# Patient Record
Sex: MALE | Race: WHITE | NOT HISPANIC OR LATINO
[De-identification: names, ages, dates, MRNs, and addresses within clinical notes are randomized per-mention and may not be internally consistent; named-entity substitution may affect disease eponyms.]

---

## 2017-01-02 ENCOUNTER — RX RENEWAL (OUTPATIENT)
Age: 67
End: 2017-01-02

## 2017-04-13 ENCOUNTER — APPOINTMENT (OUTPATIENT)
Dept: NEUROLOGY | Facility: CLINIC | Age: 67
End: 2017-04-13

## 2017-04-13 VITALS
HEART RATE: 69 BPM | OXYGEN SATURATION: 99 % | HEIGHT: 65 IN | SYSTOLIC BLOOD PRESSURE: 148 MMHG | DIASTOLIC BLOOD PRESSURE: 76 MMHG | WEIGHT: 160 LBS | BODY MASS INDEX: 26.66 KG/M2

## 2017-04-13 RX ORDER — CARBIDOPA AND LEVODOPA 25; 250 MG/1; MG/1
25-250 TABLET ORAL
Qty: 90 | Refills: 0 | Status: DISCONTINUED | COMMUNITY
Start: 2016-12-15

## 2017-04-13 RX ORDER — HYDROCODONE BITARTRATE AND HOMATROPINE METHYLBROMIDE 5; 1.5 MG/5ML; MG/5ML
5-1.5 SYRUP ORAL
Qty: 150 | Refills: 0 | Status: DISCONTINUED | COMMUNITY
Start: 2017-03-13

## 2017-10-12 ENCOUNTER — APPOINTMENT (OUTPATIENT)
Dept: NEUROLOGY | Facility: CLINIC | Age: 67
End: 2017-10-12
Payer: MEDICARE

## 2017-10-12 VITALS
OXYGEN SATURATION: 99 % | DIASTOLIC BLOOD PRESSURE: 72 MMHG | HEART RATE: 67 BPM | WEIGHT: 162 LBS | HEIGHT: 65 IN | SYSTOLIC BLOOD PRESSURE: 132 MMHG | BODY MASS INDEX: 26.99 KG/M2

## 2017-10-12 PROCEDURE — 99214 OFFICE O/P EST MOD 30 MIN: CPT

## 2018-04-12 ENCOUNTER — APPOINTMENT (OUTPATIENT)
Dept: NEUROLOGY | Facility: CLINIC | Age: 68
End: 2018-04-12

## 2018-07-09 ENCOUNTER — RX RENEWAL (OUTPATIENT)
Age: 68
End: 2018-07-09

## 2018-10-08 ENCOUNTER — RX RENEWAL (OUTPATIENT)
Age: 68
End: 2018-10-08

## 2018-10-10 ENCOUNTER — RX RENEWAL (OUTPATIENT)
Age: 68
End: 2018-10-10

## 2019-02-18 ENCOUNTER — APPOINTMENT (OUTPATIENT)
Dept: CARDIOLOGY | Facility: CLINIC | Age: 69
End: 2019-02-18
Payer: MEDICARE

## 2019-02-18 ENCOUNTER — NON-APPOINTMENT (OUTPATIENT)
Age: 69
End: 2019-02-18

## 2019-02-18 VITALS
OXYGEN SATURATION: 97 % | HEART RATE: 72 BPM | BODY MASS INDEX: 27.97 KG/M2 | WEIGHT: 174 LBS | DIASTOLIC BLOOD PRESSURE: 80 MMHG | SYSTOLIC BLOOD PRESSURE: 154 MMHG | HEIGHT: 66 IN

## 2019-02-18 PROCEDURE — 99214 OFFICE O/P EST MOD 30 MIN: CPT

## 2019-02-18 PROCEDURE — 93000 ELECTROCARDIOGRAM COMPLETE: CPT

## 2019-02-18 NOTE — DISCUSSION/SUMMARY
[FreeTextEntry1] : The patient has no signs or symptoms of active heart disease. He does have some mild palpitations and will wear a 24-hour Holter monitor. He had a high calcium score in 2016. He will repeat a stress test. We again discussed the importance of cholesterol lowering. Since he cannot tolerate regular statin medication and his LDL cholesterol remains high in the red yeast rice, I will try to get approval for the new injectable cholesterol medications. In the meantime he will go back on his red yeast rice.\par \par I would appreciate your sending a copy of recent bloodwork. Pending the above I would see him in 6 months. He needs to lose weight and hopefully his blood pressure will improve.

## 2019-02-18 NOTE — HISTORY OF PRESENT ILLNESS
[FreeTextEntry1] : I saw Erickson Schulz in the office today for a followup visit. He is a 69-year-old white male being treated for hypertension and hyperlipidemia. He has a family history of ischemic heart disease with mother and dad having heart problems in their early 60s.\par \par The patient exercises regularly and has no chest pain or shortness of breath. There is a history of prostate cancer. Status post radical prostatectomy about 10 years ago and is considered to be cured. Patient also has a tremor of his right arm and has been diagnosed with early Parkinson's disease. He is taking Azilect..\par \par The patient had a cardiac calcium score performed 2/16. The total score was 573, , circumflex 165, and RCA 70.\par \par The patient had a regular stress test 6/15 that showed no ischemia to a workload of 10 Mets. Had a carotid Doppler in January 2014 that showed mild nonobstructive plaque, without change compared to 2011.\par \par Blood work from your office dated 9/16 demonstrates a total cholesterol 208, triglycerides 62, HDL 66, and . The patient has a lot of body aches. Intolerant to statin medication Does have spinal stenosis and a lot of arthritis.\par \par He underwent spinal surgery in the fall. He had a synovial cyst. This went well but he has been sedentary during the recovery period also has stopped his Lipitor which he claims has been giving him a lot of body aches.\par \par Since I last saw the patient in 9/16 he's gained more than 10 pounds. His blood pressure is minimally elevated today which he says happens when he gains weight. He has been complaining of a slight fluttering in his chest that lasts for a few seconds and happens every day. He has no hemodynamic symptoms with this palpitation\par \par A resting 12-lead electrocardiogram demonstrates sinus rhythm and remains normal. He no longer is taking red yeast rice.

## 2019-02-18 NOTE — REASON FOR VISIT
[Follow-Up - Clinic] : a clinic follow-up of [Carotid Artery Stenosis] : carotid stenosis [Hyperlipidemia] : hyperlipidemia [Hypertension] : hypertension [FreeTextEntry1] : Family history of ischemic heart disease

## 2019-02-18 NOTE — PHYSICAL EXAM
[General Appearance - Well Developed] : well developed [Normal Appearance] : normal appearance [Well Groomed] : well groomed [General Appearance - Well Nourished] : well nourished [No Deformities] : no deformities [General Appearance - In No Acute Distress] : no acute distress [Normal Conjunctiva] : the conjunctiva exhibited no abnormalities [Normal Oral Mucosa] : normal oral mucosa [Normal Jugular Venous A Waves Present] : normal jugular venous A waves present [Normal Jugular Venous V Waves Present] : normal jugular venous V waves present [No Jugular Venous Alegria A Waves] : no jugular venous alegria A waves [Respiration, Rhythm And Depth] : normal respiratory rhythm and effort [Exaggerated Use Of Accessory Muscles For Inspiration] : no accessory muscle use [Auscultation Breath Sounds / Voice Sounds] : lungs were clear to auscultation bilaterally [Bowel Sounds] : normal bowel sounds [Abdomen Soft] : soft [Abdomen Tenderness] : non-tender [Abnormal Walk] : normal gait [Gait - Sufficient For Exercise Testing] : the gait was sufficient for exercise testing [Nail Clubbing] : no clubbing of the fingernails [Cyanosis, Localized] : no localized cyanosis [Skin Color & Pigmentation] : normal skin color and pigmentation [Skin Turgor] : normal skin turgor [] : no rash [Oriented To Time, Place, And Person] : oriented to person, place, and time [Impaired Insight] : insight and judgment were intact [No Anxiety] : not feeling anxious [5th Left ICS - MCL] : palpated at the 5th LICS in the midclavicular line [Normal] : normal [No Precordial Heave] : no precordial heave was noted [Normal Rate] : normal [Normal S1] : normal S1 [Normal S2] : normal S2 [No Murmur] : no murmurs heard [2+] : left 2+ [No Abnormalities] : the abdominal aorta was not enlarged and no bruit was heard [No Pitting Edema] : no pitting edema present [Apical Thrill] : no thrill palpable at the apex [S3] : no S3 [S4] : no S4 [Right Carotid Bruit] : no bruit heard over the right carotid [Left Carotid Bruit] : no bruit heard over the left carotid [Right Femoral Bruit] : no bruit heard over the right femoral artery [Left Femoral Bruit] : no bruit heard over the left femoral artery

## 2019-02-18 NOTE — REVIEW OF SYSTEMS
[Eyeglasses] : currently wearing eyeglasses [Heartburn] : heartburn [Joint Pain] : joint pain [Skin: A Rash] : rash: [Negative] : Neurological [Fever] : no fever [Headache] : no headache [Chills] : no chills [Feeling Fatigued] : not feeling fatigued [Earache] : no earache [Sore Throat] : no sore throat [Sinus Pressure] : no sinus pressure [Skin Lesions] : no skin lesions [Depression] : no depression [Anxiety] : no anxiety [Excessive Thirst] : no polydipsia [Easy Bleeding] : no tendency for easy bleeding [Easy Bruising] : no tendency for easy bruising

## 2019-02-21 LAB
CHOLEST SERPL-MCNC: 198 MG/DL
CHOLEST/HDLC SERPL: 2.8 RATIO
HDLC SERPL-MCNC: 72 MG/DL
LDLC SERPL CALC-MCNC: 119 MG/DL
TRIGL SERPL-MCNC: 34 MG/DL

## 2019-03-06 ENCOUNTER — APPOINTMENT (OUTPATIENT)
Dept: CARDIOLOGY | Facility: CLINIC | Age: 69
End: 2019-03-06
Payer: MEDICARE

## 2019-03-06 PROCEDURE — 93224 XTRNL ECG REC UP TO 48 HRS: CPT

## 2019-03-13 ENCOUNTER — NON-APPOINTMENT (OUTPATIENT)
Age: 69
End: 2019-03-13

## 2019-04-09 ENCOUNTER — APPOINTMENT (OUTPATIENT)
Dept: CARDIOLOGY | Facility: CLINIC | Age: 69
End: 2019-04-09

## 2019-04-20 ENCOUNTER — TRANSCRIPTION ENCOUNTER (OUTPATIENT)
Age: 69
End: 2019-04-20

## 2019-05-22 ENCOUNTER — NON-APPOINTMENT (OUTPATIENT)
Age: 69
End: 2019-05-22

## 2019-05-23 ENCOUNTER — RESULT REVIEW (OUTPATIENT)
Age: 69
End: 2019-05-23

## 2019-09-21 ENCOUNTER — INPATIENT (INPATIENT)
Facility: HOSPITAL | Age: 69
LOS: 0 days | Discharge: HOME | DRG: 123 | End: 2019-09-22
Attending: FAMILY MEDICINE | Admitting: FAMILY MEDICINE
Payer: MEDICARE

## 2019-09-21 VITALS
HEART RATE: 66 BPM | DIASTOLIC BLOOD PRESSURE: 96 MMHG | RESPIRATION RATE: 17 BRPM | WEIGHT: 169.98 LBS | OXYGEN SATURATION: 98 % | SYSTOLIC BLOOD PRESSURE: 160 MMHG | TEMPERATURE: 98 F | HEIGHT: 66 IN

## 2019-09-21 DIAGNOSIS — R41.82 ALTERED MENTAL STATUS, UNSPECIFIED: ICD-10-CM

## 2019-09-21 DIAGNOSIS — Z90.79 ACQUIRED ABSENCE OF OTHER GENITAL ORGAN(S): Chronic | ICD-10-CM

## 2019-09-21 DIAGNOSIS — M71.30 OTHER BURSAL CYST, UNSPECIFIED SITE: Chronic | ICD-10-CM

## 2019-09-21 DIAGNOSIS — S89.90XA UNSPECIFIED INJURY OF UNSPECIFIED LOWER LEG, INITIAL ENCOUNTER: Chronic | ICD-10-CM

## 2019-09-21 LAB
ALBUMIN SERPL ELPH-MCNC: 3.4 G/DL — SIGNIFICANT CHANGE UP (ref 3.3–5)
ALBUMIN SERPL ELPH-MCNC: 3.8 G/DL — SIGNIFICANT CHANGE UP (ref 3.3–5)
ALP SERPL-CCNC: 54 U/L — SIGNIFICANT CHANGE UP (ref 40–120)
ALP SERPL-CCNC: 61 U/L — SIGNIFICANT CHANGE UP (ref 40–120)
ALT FLD-CCNC: 22 U/L — SIGNIFICANT CHANGE UP (ref 12–78)
ALT FLD-CCNC: 23 U/L — SIGNIFICANT CHANGE UP (ref 12–78)
ANION GAP SERPL CALC-SCNC: 6 MMOL/L — SIGNIFICANT CHANGE UP (ref 5–17)
ANION GAP SERPL CALC-SCNC: 7 MMOL/L — SIGNIFICANT CHANGE UP (ref 5–17)
APTT BLD: 32.2 SEC — SIGNIFICANT CHANGE UP (ref 27.5–36.3)
AST SERPL-CCNC: 19 U/L — SIGNIFICANT CHANGE UP (ref 15–37)
AST SERPL-CCNC: 24 U/L — SIGNIFICANT CHANGE UP (ref 15–37)
BILIRUB SERPL-MCNC: 0.5 MG/DL — SIGNIFICANT CHANGE UP (ref 0.2–1.2)
BILIRUB SERPL-MCNC: 0.7 MG/DL — SIGNIFICANT CHANGE UP (ref 0.2–1.2)
BUN SERPL-MCNC: 18 MG/DL — SIGNIFICANT CHANGE UP (ref 7–23)
BUN SERPL-MCNC: 18 MG/DL — SIGNIFICANT CHANGE UP (ref 7–23)
CALCIUM SERPL-MCNC: 8.5 MG/DL — SIGNIFICANT CHANGE UP (ref 8.5–10.1)
CALCIUM SERPL-MCNC: 8.6 MG/DL — SIGNIFICANT CHANGE UP (ref 8.5–10.1)
CHLORIDE SERPL-SCNC: 108 MMOL/L — SIGNIFICANT CHANGE UP (ref 96–108)
CHLORIDE SERPL-SCNC: 110 MMOL/L — HIGH (ref 96–108)
CK SERPL-CCNC: 118 U/L — SIGNIFICANT CHANGE UP (ref 26–308)
CO2 SERPL-SCNC: 27 MMOL/L — SIGNIFICANT CHANGE UP (ref 22–31)
CO2 SERPL-SCNC: 28 MMOL/L — SIGNIFICANT CHANGE UP (ref 22–31)
CREAT SERPL-MCNC: 1.2 MG/DL — SIGNIFICANT CHANGE UP (ref 0.5–1.3)
CREAT SERPL-MCNC: 1.25 MG/DL — SIGNIFICANT CHANGE UP (ref 0.5–1.3)
GLUCOSE SERPL-MCNC: 136 MG/DL — HIGH (ref 70–99)
GLUCOSE SERPL-MCNC: 97 MG/DL — SIGNIFICANT CHANGE UP (ref 70–99)
HCT VFR BLD CALC: 42.4 % — SIGNIFICANT CHANGE UP (ref 39–50)
HGB BLD-MCNC: 13.9 G/DL — SIGNIFICANT CHANGE UP (ref 13–17)
INR BLD: 0.95 RATIO — SIGNIFICANT CHANGE UP (ref 0.88–1.16)
MCHC RBC-ENTMCNC: 29.7 PG — SIGNIFICANT CHANGE UP (ref 27–34)
MCHC RBC-ENTMCNC: 32.8 GM/DL — SIGNIFICANT CHANGE UP (ref 32–36)
MCV RBC AUTO: 90.6 FL — SIGNIFICANT CHANGE UP (ref 80–100)
NT-PROBNP SERPL-SCNC: 21 PG/ML — SIGNIFICANT CHANGE UP (ref 0–125)
PLATELET # BLD AUTO: 253 K/UL — SIGNIFICANT CHANGE UP (ref 150–400)
POTASSIUM SERPL-MCNC: 3.6 MMOL/L — SIGNIFICANT CHANGE UP (ref 3.5–5.3)
POTASSIUM SERPL-MCNC: 4.4 MMOL/L — SIGNIFICANT CHANGE UP (ref 3.5–5.3)
POTASSIUM SERPL-SCNC: 3.6 MMOL/L — SIGNIFICANT CHANGE UP (ref 3.5–5.3)
POTASSIUM SERPL-SCNC: 4.4 MMOL/L — SIGNIFICANT CHANGE UP (ref 3.5–5.3)
PROT SERPL-MCNC: 6.6 GM/DL — SIGNIFICANT CHANGE UP (ref 6–8.3)
PROT SERPL-MCNC: 7.2 GM/DL — SIGNIFICANT CHANGE UP (ref 6–8.3)
PROTHROM AB SERPL-ACNC: 10.5 SEC — SIGNIFICANT CHANGE UP (ref 10–12.9)
RBC # BLD: 4.68 M/UL — SIGNIFICANT CHANGE UP (ref 4.2–5.8)
RBC # FLD: 12.8 % — SIGNIFICANT CHANGE UP (ref 10.3–14.5)
SODIUM SERPL-SCNC: 142 MMOL/L — SIGNIFICANT CHANGE UP (ref 135–145)
SODIUM SERPL-SCNC: 144 MMOL/L — SIGNIFICANT CHANGE UP (ref 135–145)
TROPONIN I SERPL-MCNC: <0.015 NG/ML — SIGNIFICANT CHANGE UP (ref 0.01–0.04)
WBC # BLD: 7.66 K/UL — SIGNIFICANT CHANGE UP (ref 3.8–10.5)
WBC # FLD AUTO: 7.66 K/UL — SIGNIFICANT CHANGE UP (ref 3.8–10.5)

## 2019-09-21 PROCEDURE — 71045 X-RAY EXAM CHEST 1 VIEW: CPT | Mod: 26

## 2019-09-21 PROCEDURE — 70544 MR ANGIOGRAPHY HEAD W/O DYE: CPT | Mod: XU

## 2019-09-21 PROCEDURE — 80053 COMPREHEN METABOLIC PANEL: CPT

## 2019-09-21 PROCEDURE — 85025 COMPLETE CBC W/AUTO DIFF WBC: CPT

## 2019-09-21 PROCEDURE — 82550 ASSAY OF CK (CPK): CPT

## 2019-09-21 PROCEDURE — 36415 COLL VENOUS BLD VENIPUNCTURE: CPT

## 2019-09-21 PROCEDURE — 70450 CT HEAD/BRAIN W/O DYE: CPT | Mod: 26

## 2019-09-21 PROCEDURE — 84100 ASSAY OF PHOSPHORUS: CPT

## 2019-09-21 PROCEDURE — 86803 HEPATITIS C AB TEST: CPT

## 2019-09-21 PROCEDURE — 93010 ELECTROCARDIOGRAM REPORT: CPT

## 2019-09-21 PROCEDURE — 70547 MR ANGIOGRAPHY NECK W/O DYE: CPT

## 2019-09-21 PROCEDURE — 70551 MRI BRAIN STEM W/O DYE: CPT

## 2019-09-21 PROCEDURE — 93971 EXTREMITY STUDY: CPT | Mod: 26,RT

## 2019-09-21 PROCEDURE — 83735 ASSAY OF MAGNESIUM: CPT

## 2019-09-21 RX ORDER — RASAGILINE 0.5 MG/1
1 TABLET ORAL DAILY
Refills: 0 | Status: DISCONTINUED | OUTPATIENT
Start: 2019-09-21 | End: 2019-09-22

## 2019-09-21 RX ORDER — PANTOPRAZOLE SODIUM 20 MG/1
40 TABLET, DELAYED RELEASE ORAL
Refills: 0 | Status: DISCONTINUED | OUTPATIENT
Start: 2019-09-21 | End: 2019-09-22

## 2019-09-21 RX ORDER — ROPINIROLE 8 MG/1
6 TABLET, FILM COATED, EXTENDED RELEASE ORAL
Refills: 0 | Status: DISCONTINUED | OUTPATIENT
Start: 2019-09-21 | End: 2019-09-22

## 2019-09-21 RX ORDER — ASPIRIN/CALCIUM CARB/MAGNESIUM 324 MG
81 TABLET ORAL DAILY
Refills: 0 | Status: DISCONTINUED | OUTPATIENT
Start: 2019-09-21 | End: 2019-09-22

## 2019-09-21 NOTE — ED PROVIDER NOTE - CLINICAL SUMMARY MEDICAL DECISION MAKING FREE TEXT BOX
69 M hx of Parkinson's HLD presents to ED for weakness, double vision progressively worsening over last few days. hasn't felt right over last few days. exam with mild RLE swelling but normal neuro exam. will obtain labs, CT, US, reassess. 69 M hx of Parkinson's HLD presents to ED for weakness, double vision progressively worsening over last few days. hasn't felt right over last few days. exam with mild RLE swelling but normal neuro exam. NIH 0 outside of TPA window. will obtain labs, CT, US, reassess. 69 M hx of Parkinson's HLD presents to ED for weakness, double vision progressively worsening over last few days. hasn't felt right over last few days. exam with mild RLE swelling but normal neuro exam. NIH 0 outside of TPA window. pt with known partially detached retina being followed - new symptoms now is difficulty concentrating weakness slight AMS.  will obtain labs, CT, US, to r/o infections etiology/cva - low concern that ocular etiology is causing other symptoms.

## 2019-09-21 NOTE — ED PROVIDER NOTE - NS_ ATTENDINGSCRIBEDETAILS _ED_A_ED_FT
I, Tomas Grey MD,  performed the initial face to face bedside interview with this patient regarding history of present illness, review of symptoms and relevant past medical, social and family history.  I completed an independent physical examination.  I was the initial provider who evaluated this patient.  The history, relevant review of systems, past medical and surgical history, medical decision making, and physical examination was documented by the scribe in my presence and I attest to the accuracy of the documentation.

## 2019-09-21 NOTE — H&P ADULT - HISTORY OF PRESENT ILLNESS
70 y/o male with h/o Parkinson disease, HLD, partially detached retina with mild diplopia at baseline presents to the ED c/o difficulty concentrating, double vision noted at 11am while working on his computer.  no associated unilateral weakness, slurred speech, chest pain, neck pain or palpitations.  admits to restless sleep due to shoudler pain from arthritis.  diplopia resolved upon arrival to ED. Pt also noted RLE swelling, LE cramping, worse at night for several weeks.  recently started on new cholestrol medication as was unable to toelrate statin due to muscle aches. states is back to normal, besides RLE swelling.      In ED, LE doppler- no dvt.  ecg-normal.  tropx1-neg.  cth-no acute changes. cxr-normal

## 2019-09-21 NOTE — ED PROVIDER NOTE - OBJECTIVE STATEMENT
70 y/o male with PMHx of Parkinson's, HLD, partially detached retina presents to the ED difficulty concentrating, double vision noted at 11am. Pt reports that he has been "feeling off" for a few days now. Notes that double vision is a more severe episode of that which he had when he had known prior partially detached retina. Pt also with RLE swelling, LE cramping (worse at night) x5 days. Also with, Denies slurred speech, CP, SOB, fever. Nonsmoker. No EtOH. 68 y/o male with PMHx of Parkinson's, HLD, partially detached retina presents to the ED difficulty concentrating, double vision noted at 11am. Pt reports that he has been "feeling off" for a few days now. Notes that double vision is a more severe episode of that which he had when he had known prior partially detached retina. Pt also with RLE swelling, LE cramping (worse at night) x5 days. Also with weakness and mild ams.  Denies slurred speech, CP, SOB, fever. Nonsmoker. No EtOH. 68 y/o male with PMHx of Parkinson's, HLD, partially detached retina presents to the ED difficulty concentrating, double vision noted at 11am. Pt reports that he has been "feeling off" for a few days now. Notes that double vision is a always present 2/2 a known prior partially detached retina; however now he notices it more and it is difficult to concentrate. Pt also with RLE swelling, LE cramping (worse at night) x5 days. Also with weakness and mild ams.  Denies slurred speech, CP, SOB, fever. Nonsmoker. No EtOH.

## 2019-09-21 NOTE — PATIENT PROFILE ADULT - STATED REASON FOR ADMISSION
working on the computer this am started having double and triple vision RLE swollen thought could have thrown a clot

## 2019-09-21 NOTE — H&P ADULT - ASSESSMENT
diplopia, generalized weakness and lethergy, with partial retinal detachement, r/o cva  -admit to tele   -neuro consult   -neuro checks q6h  -strict i/o  -check am labs     RLE swelling and aches, doppler- no dvt   -check cpk    parkson disease  -cont home meds     dvt prophylaxis   -ipc bilaterally

## 2019-09-21 NOTE — ED ADULT TRIAGE NOTE - CHIEF COMPLAINT QUOTE
pt began feeling tired/foggy and having double vision at 0500. Assessed by Dr. Garay , no code stroke called. Direct bed into main .

## 2019-09-21 NOTE — H&P ADULT - NSHPPHYSICALEXAM_GEN_ALL_CORE
PHYSICAL EXAM:    GENERAL: NAD, Alert & Oriented X3, Well-groomed, Well-developed  HEENT: Moist mucous membranes  HEAD:  Atraumatic, Normocephalic  EYES: EOMI, PERRLA, Conjunctiva and sclera clear  NECK: Supple, No JVD, No lymphadenopathy  CHEST/LUNG: Clear to auscultation bilaterally; No rales, rhonchi, wheezing, or rubs  HEART: Regular rate and rhythm; No murmurs, rubs, or gallops  ABDOMEN: Soft, Non-tender, Non-distended; Bowel sounds present  GENITOURINARY- Voiding, No palpable bladder  EXTREMITIES:  2+ Peripheral Pulses, No clubbing, cyanosis.  +2 LE pitting edema on right  MUSCULOSKELTAL- No muscle tenderness, Muscle tone normal, No joint tenderness, no Joint swelling, FROM  SKIN: No rash, No lesion  NEURO: CN II-XII intact, Motor Strength- 5/5 B/L upper and lower extremities; DTRs 2+ intact and symmetric.  +tremor in right hand

## 2019-09-21 NOTE — ED ADULT NURSE NOTE - OBJECTIVE STATEMENT
PT c/o double vision at 0500 this am.  PT denies other neurological symptoms relating to BL limbs and aphagia.  VSS, pt c/o BL LE cramping.  VSS

## 2019-09-21 NOTE — ED ADULT NURSE NOTE - NSIMPLEMENTINTERV_GEN_ALL_ED
Implemented All Universal Safety Interventions:  [place] to call system. Call bell, personal items and telephone within reach. Instruct patient to call for assistance. Room bathroom lighting operational. Non-slip footwear when patient is off stretcher. Physically safe environment: no spills, clutter or unnecessary equipment. Stretcher in lowest position, wheels locked, appropriate side rails in place.

## 2019-09-21 NOTE — ED PROVIDER NOTE - PROGRESS NOTE DETAILS
labs ct unremarkable but pt still feeling off mild double vision and difficulty concentrating slightly altered as per family - pt endorsed to Dr. Shine requests that we DNM. Tomas Grey M.D., Attending Physician

## 2019-09-21 NOTE — ED PROVIDER NOTE - PHYSICAL EXAMINATION
Constitutional: mild distress AAOx3  Eyes: PERRLA EOMI  Head: Normocephalic atraumatic  Mouth: MMM  Cardiac: regular rate. normal peripheral pulses.  Resp: Lungs CTAB  GI: Abd s/nt/nd  Neuro: CN2-12 intact.  normal strength, sensation, coordination. NIH=0  Skin: No rashes. +swelling to RLE. Constitutional: mild distress AAOx3  Eyes: PERRLA EOMI  Head: Normocephalic atraumatic  Mouth: MMM  Cardiac: regular rate. normal peripheral pulses.  Resp: Lungs CTAB  GI: Abd s/nt/nd  Neuro: CN2-12 intact.  normal strength, sensation, coordination. NIH=0 resting tremor  Skin: No rashes. +swelling to RLE.

## 2019-09-22 ENCOUNTER — TRANSCRIPTION ENCOUNTER (OUTPATIENT)
Age: 69
End: 2019-09-22

## 2019-09-22 VITALS
RESPIRATION RATE: 18 BRPM | HEART RATE: 66 BPM | SYSTOLIC BLOOD PRESSURE: 145 MMHG | OXYGEN SATURATION: 98 % | DIASTOLIC BLOOD PRESSURE: 66 MMHG | TEMPERATURE: 98 F

## 2019-09-22 LAB
BASOPHILS # BLD AUTO: 0.06 K/UL — SIGNIFICANT CHANGE UP (ref 0–0.2)
BASOPHILS NFR BLD AUTO: 0.8 % — SIGNIFICANT CHANGE UP (ref 0–2)
EOSINOPHIL # BLD AUTO: 0.28 K/UL — SIGNIFICANT CHANGE UP (ref 0–0.5)
EOSINOPHIL NFR BLD AUTO: 3.8 % — SIGNIFICANT CHANGE UP (ref 0–6)
HCT VFR BLD CALC: 41.9 % — SIGNIFICANT CHANGE UP (ref 39–50)
HCV AB S/CO SERPL IA: 0.16 S/CO — SIGNIFICANT CHANGE UP (ref 0–0.99)
HCV AB SERPL-IMP: SIGNIFICANT CHANGE UP
HGB BLD-MCNC: 13.7 G/DL — SIGNIFICANT CHANGE UP (ref 13–17)
IMM GRANULOCYTES NFR BLD AUTO: 0.3 % — SIGNIFICANT CHANGE UP (ref 0–1.5)
LYMPHOCYTES # BLD AUTO: 2.38 K/UL — SIGNIFICANT CHANGE UP (ref 1–3.3)
LYMPHOCYTES # BLD AUTO: 32.2 % — SIGNIFICANT CHANGE UP (ref 13–44)
MAGNESIUM SERPL-MCNC: 2.3 MG/DL — SIGNIFICANT CHANGE UP (ref 1.6–2.6)
MCHC RBC-ENTMCNC: 29.6 PG — SIGNIFICANT CHANGE UP (ref 27–34)
MCHC RBC-ENTMCNC: 32.7 GM/DL — SIGNIFICANT CHANGE UP (ref 32–36)
MCV RBC AUTO: 90.5 FL — SIGNIFICANT CHANGE UP (ref 80–100)
MONOCYTES # BLD AUTO: 0.58 K/UL — SIGNIFICANT CHANGE UP (ref 0–0.9)
MONOCYTES NFR BLD AUTO: 7.8 % — SIGNIFICANT CHANGE UP (ref 2–14)
NEUTROPHILS # BLD AUTO: 4.08 K/UL — SIGNIFICANT CHANGE UP (ref 1.8–7.4)
NEUTROPHILS NFR BLD AUTO: 55.1 % — SIGNIFICANT CHANGE UP (ref 43–77)
PHOSPHATE SERPL-MCNC: 2.3 MG/DL — LOW (ref 2.5–4.5)
PLATELET # BLD AUTO: 260 K/UL — SIGNIFICANT CHANGE UP (ref 150–400)
RBC # BLD: 4.63 M/UL — SIGNIFICANT CHANGE UP (ref 4.2–5.8)
RBC # FLD: 12.8 % — SIGNIFICANT CHANGE UP (ref 10.3–14.5)
WBC # BLD: 7.4 K/UL — SIGNIFICANT CHANGE UP (ref 3.8–10.5)
WBC # FLD AUTO: 7.4 K/UL — SIGNIFICANT CHANGE UP (ref 3.8–10.5)

## 2019-09-22 PROCEDURE — 70551 MRI BRAIN STEM W/O DYE: CPT | Mod: 26

## 2019-09-22 PROCEDURE — 99223 1ST HOSP IP/OBS HIGH 75: CPT

## 2019-09-22 PROCEDURE — 70544 MR ANGIOGRAPHY HEAD W/O DYE: CPT | Mod: 26,59

## 2019-09-22 PROCEDURE — 72198 MR ANGIO PELVIS W/O & W/DYE: CPT | Mod: 26

## 2019-09-22 PROCEDURE — 70547 MR ANGIOGRAPHY NECK W/O DYE: CPT | Mod: 26

## 2019-09-22 RX ADMIN — RASAGILINE 1 MILLIGRAM(S): 0.5 TABLET ORAL at 05:34

## 2019-09-22 RX ADMIN — PANTOPRAZOLE SODIUM 40 MILLIGRAM(S): 20 TABLET, DELAYED RELEASE ORAL at 05:33

## 2019-09-22 RX ADMIN — ROPINIROLE 6 MILLIGRAM(S): 8 TABLET, FILM COATED, EXTENDED RELEASE ORAL at 05:34

## 2019-09-22 RX ADMIN — Medication 81 MILLIGRAM(S): at 11:24

## 2019-09-22 NOTE — DISCHARGE NOTE NURSING/CASE MANAGEMENT/SOCIAL WORK - PATIENT PORTAL LINK FT
You can access the FollowMyHealth Patient Portal offered by [facility] by registering at the following website: [email or web address]. By joining [facility]’s FollowMyHealth portal, you will also be able to view your health information using other applications (apps) compatible with our system.

## 2019-09-22 NOTE — DISCHARGE NOTE PROVIDER - HOSPITAL COURSE
70 y/o male with h/o Parkinson disease, HLD, partially detached retina with mild diplopia at baseline presents to the ED c/o difficulty concentrating, double vision noted at 11am while working on his computer.  no associated unilateral weakness, slurred speech, chest pain, neck pain or palpitations.  admits to restless sleep due to shoulder pain from arthritis.  diplopia resolved upon arrival to ED. Pt also noted RLE swelling, LE cramping, worse at night for several weeks.  recently started on new cholesterol medication as was unable to tolerate statin due to muscle aches. states is back to normal, besides RLE swelling.      He was admitted for further w/u. He underwent RLE doppler which was negative. MRI head, MRA head and neck were negative. Tele was negative. His symptoms did not recur while here. HE was seen by neurology while here as well. He will be discharged home today. He is advised outpt f/u with opthalmology        T(C): 36.5 (09-22-19 @ 05:26), Max: 36.8 (09-21-19 @ 2FOCEBS8:47)    HR: 66 (09-22-19 @ 05:26) (65 - 80)    BP: 148/74 (09-22-19 @ 05:26) (113/76 - 160/96)    RR: 18 (09-22-19 @ 05:26) (16 - 18)    SpO2: 95% (09-22-19 @ 05:26) (95% - 100%)            PHYSICAL EXAM:        GENERAL: Comfortable, no acute distress    HEAD:  Atraumatic, Normocephalic    EYES: EOMI, PERRLA    HEENT: Moist mucous membranes    NECK: Supple, No JVD    NERVOUS SYSTEM:  Alert & Oriented X3, Motor Strength 5/5 B/L upper and lower extremities, +tremor, cogwheel rigidity+    CHEST/LUNG: Clear to auscultation bilaterally    HEART: Regular rate and rhythm; No murmurs, rubs, or gallops    ABDOMEN: Soft, Nontender, Nondistended; Bowel sounds present    GENITOURINARY- Voiding, no palpable bladder    EXTREMITIES:  No clubbing, cyanosis, or edema    MUSCULOSKELETAL- No muscle tenderness, No joint tenderness    SKIN-no rash                LABS:                            13.7     7.40  )-----------( 260      ( 22 Sep 2019 07:55 )               41.9         09-21        144  |  110<H>  |  18    ----------------------------<  136<H>    3.6   |  27  |  1.20        Ca    8.6      21 Sep 2019 16:35    Phos  2.3     09-22    Mg     2.3     09-22        TPro  6.6  /  Alb  3.4  /  TBili  0.7  /  DBili  x   /  AST  19  /  ALT  22  /  AlkPhos  54  09-21    PT/INR - ( 21 Sep 2019 12:16 )   PT: 10.5 sec;   INR: 0.95 ratio      PTT - ( 21 Sep 2019 12:16 )  PTT:32.2 sec         MR Head No Cont (09.22.19 @ 09:10) >    IMPRESSION:     Brain MRI: No acute infarct.    Brain MRA: No large vessel occlusion or aneurysm.    Neck MRA: No hemodynamically significant stenosis of the bilateral     cervical ICAs by NASCET criteria.        US Duplex Venous Lower Ext Ltd, Right (09.21.19 @ 13:30) >    IMPRESSION:   Unremarkable ultrasound of the right lower extremity deep     venous system.            FINAL DIAGNOSIS:    1. DIPLOPIA-IMPROVED, OUTPT F/U WITH OPTHALMOLOGY, CVA RULED OUT.    2. PARTIAL RETINAL DETACHMENT.    3. PARKINSONS    4. HLD        time taken for dc 42 min    plan d/w patient/neurology.     left message for pcp re: dc.

## 2019-09-22 NOTE — DISCHARGE NOTE PROVIDER - NSDCCPCAREPLAN_GEN_ALL_CORE_FT
PRINCIPAL DISCHARGE DIAGNOSIS  Diagnosis: Diplopia  Assessment and Plan of Treatment: further evaluation  follow up with your opthalmologist/retinal specialist this week.

## 2019-09-22 NOTE — CONSULT NOTE ADULT - ASSESSMENT
70 y/o male with h/o Parkinson disease, HLD, partially detached retina with mild diplopia at baseline presents to the ED c/o difficulty concentrating, double vision noted at 11am while working on his computer. R/o Metabolic causes    R/o Neurocardiogenic causes.    MRI /MRA brain negative.    H/o PD stable with meds.    Correct underlying metabolic causes.    D/c planning.    Follow up with PCP and Neurologist.

## 2019-09-22 NOTE — CONSULT NOTE ADULT - SUBJECTIVE AND OBJECTIVE BOX
Patient is a 69y old  Male who presents with a chief complaint of diplopia, weakness       HPI:  68 y/o male with h/o Parkinson disease, HLD, partially detached retina with mild diplopia at baseline presents to the ED c/o difficulty concentrating, double vision noted at 11am yesterday  while working on his computer.  No associated unilateral weakness, slurred speech, chest pain, neck pain or palpitations. H/o  diplopia in the past worsening after his sx started which resolved upon arrival to ED. Pt also noted RLE swelling, LE cramping, worse at night for several weeks.  Recently  started on new cholestrol medication as was unable to tolerate   statin due to muscle aches. States  is back to normal, besides RLE swelling.  Being followed by DR. Willian watson for PD . No new PD sx.  Feeling better and wants to go home.     PAST MEDICAL & SURGICAL HISTORY:  Cataracts, bilateral  Parkinson disease  HLD (hyperlipidemia)  Synovial cyst: removed from back and LEFT HAND  H/O radical prostatectomy: 2003  Knee injury: Arthroscopy 1994 due to soccer injury      FAMILY HISTORY:      Social Hx:  Nonsmoker, no drug or alcohol use    MEDICATIONS  (STANDING):  aspirin  chewable 81 milliGRAM(s) Oral daily  pantoprazole    Tablet 40 milliGRAM(s) Oral before breakfast  rasagiline Tablet 1 milliGRAM(s) Oral daily  rOPINIRole 6 milliGRAM(s) Oral two times a day       Allergies    No Known Allergies      ROS: Pertinent positives in HPI, all other ROS were reviewed and are negative.      Vital Signs Last 24 Hrs  T(C): 36.5 (22 Sep 2019 05:26), Max: 36.8 (21 Sep 2019 20:47)  T(F): 97.7 (22 Sep 2019 05:26), Max: 98.3 (21 Sep 2019 20:47)  HR: 66 (22 Sep 2019 05:26) (65 - 80)  BP: 148/74 (22 Sep 2019 05:26) (113/76 - 160/96)  BP(mean): --  RR: 18 (22 Sep 2019 05:26) (16 - 18)  SpO2: 95% (22 Sep 2019 05:26) (95% - 100%)        Constitutional: awake and alert.  HEENT: PERRLA, EOMI, No diplopia   now  Neck: Supple.  Respiratory: Breath sounds are clear bilaterally  Cardiovascular: S1 and S2, regular  rhythm  Gastrointestinal: soft, nontender  Extremities:  no edema  Vascular:  Carotid Bruit - no  Musculoskeletal: no joint swelling/tenderness, no abnormal movements  Skin: No rashes    Neurological exam:  HF: A x O x 3. Appropriately interactive, normal affect. Speech fluent, No Aphasia or paraphasic errors.   CN: LIGIA, EOMI, VFF, facial sensation normal, no NLFD, tongue midline, gag intact  Motor: No pronator drift, Strength 5/5 in all 4 ext, normal bulk and tone, Rest tremors, No cogwheel rigidity.    Sens: Intact to light touch   Reflexes: Symmetric and normal . BJ 2+, BR 2+, KJ 2+, AJ 1+, downgoing toes b/l  Coord:  No FNFA, dysmetria, DOLORES intact   Gait/Balance: Cannot test    NIHSS: 0      Labs:   09-21    144  |  110<H>  |  18  ----------------------------<  136<H>  3.6   |  27  |  1.20    Ca    8.6      21 Sep 2019 16:35  Phos  2.3     09-22  Mg     2.3     09-22    TPro  6.6  /  Alb  3.4  /  TBili  0.7  /  DBili  x   /  AST  19  /  ALT  22  /  AlkPhos  54  09-21                              13.7   7.40  )-----------( 260      ( 22 Sep 2019 07:55 )             41.9       Radiology:    < from: MR Angio Neck No Cont (09.22.19 @ 09:10) >    IMPRESSION:     Brain MRI: No acute infarct.    Brain MRA: No large vessel occlusion or aneurysm.    Neck MRA: No hemodynamically significant stenosis of the bilateral   cervical ICAs by NASCET criteria.    < from: CT Head No Cont (09.21.19 @ 12:15) >  Impression: No evidence of acute hemorrhage mass or mass effect.

## 2019-10-01 DIAGNOSIS — H53.2 DIPLOPIA: ICD-10-CM

## 2019-10-01 DIAGNOSIS — R53.1 WEAKNESS: ICD-10-CM

## 2019-10-01 DIAGNOSIS — R41.82 ALTERED MENTAL STATUS, UNSPECIFIED: ICD-10-CM

## 2019-10-01 DIAGNOSIS — G20 PARKINSON'S DISEASE: ICD-10-CM

## 2019-10-01 DIAGNOSIS — H33.20 SEROUS RETINAL DETACHMENT, UNSPECIFIED EYE: ICD-10-CM

## 2019-10-01 DIAGNOSIS — E78.5 HYPERLIPIDEMIA, UNSPECIFIED: ICD-10-CM

## 2019-10-01 DIAGNOSIS — R60.0 LOCALIZED EDEMA: ICD-10-CM

## 2019-10-31 PROBLEM — H26.9 UNSPECIFIED CATARACT: Chronic | Status: ACTIVE | Noted: 2019-09-21

## 2019-10-31 PROBLEM — E78.5 HYPERLIPIDEMIA, UNSPECIFIED: Chronic | Status: ACTIVE | Noted: 2019-09-21

## 2019-10-31 PROBLEM — G20 PARKINSON'S DISEASE: Chronic | Status: ACTIVE | Noted: 2019-09-21

## 2019-11-05 ENCOUNTER — APPOINTMENT (OUTPATIENT)
Dept: CARDIOLOGY | Facility: CLINIC | Age: 69
End: 2019-11-05
Payer: MEDICARE

## 2019-11-05 VITALS
SYSTOLIC BLOOD PRESSURE: 165 MMHG | BODY MASS INDEX: 28.12 KG/M2 | WEIGHT: 175 LBS | DIASTOLIC BLOOD PRESSURE: 84 MMHG | HEART RATE: 70 BPM | OXYGEN SATURATION: 97 % | HEIGHT: 66 IN

## 2019-11-05 DIAGNOSIS — G20 PARKINSON'S DISEASE: ICD-10-CM

## 2019-11-05 PROCEDURE — 99214 OFFICE O/P EST MOD 30 MIN: CPT

## 2019-11-05 NOTE — HISTORY OF PRESENT ILLNESS
[FreeTextEntry1] : I saw Erickson Schulz in the office today for a followup visit. He is a 69-year-old white male being treated for hypertension and hyperlipidemia. He has a family history of ischemic heart disease with mother and dad having heart problems in their early 60s.\par \par The patient exercises regularly and has no chest pain or shortness of breath. There is a history of prostate cancer. Status post radical prostatectomy about 10 years ago and is considered to be cured. Patient also has a tremor of his right arm and has been diagnosed with early Parkinson's disease. He is taking Azilect..\par \par The patient had a cardiac calcium score performed 2/16. The total score was 573, , circumflex 165, and RCA 70.\par \par The patient had a regular stress test 6/15 that showed no ischemia to a workload of 10 Mets. Had a carotid Doppler in January 2014 that showed mild nonobstructive plaque, without change compared to 2011.\par \par Recent blood work dated 5/19 demonstrates a total cholesterol 144, triglycerides 37, HDL 70, and LDL 60.. The patient has a lot of body aches. Intolerant to statin medication Does have spinal stenosis and a lot of arthritis.He is now on Repatha.\par \par He underwent spinal surgery in the fall. He had a synovial cyst. This went well but he has been sedentary during the recovery period also has stopped his Lipitor which he claims has been giving him a lot of body aches.\par \par Over the course of the past several years, he's gained more than 10 pounds. His blood pressure is elevated today which he says happens when he gains weight. He has been complaining of a slight fluttering in his chest that lasts for a few seconds and happens every day. He has no hemodynamic symptoms with this palpitation\par \par He had a Holter monitor performed 3/19. Heart rate between 48 and 106 with an average rate of 71. 36 VPCs, 134 APCs, one couplet, and one triplet.\par \par Since he's been on Praulent he ángel developed mild bilateral leg edema and some joint swelling and pain which he attributes to the medication. He didn't take his Sunday's dose. In addition, he never scheduled a stress test because he sprained his ankle. He has been less active as a result has continued to gain weight.\par

## 2019-11-05 NOTE — DISCUSSION/SUMMARY
[FreeTextEntry1] : The patient most temporally hold the Praluent and see if his symptoms resolve. When he is able he will schedule a stress test. He is going to make an effort to do more exercise and try to lose weight and he has gained. He'll monitor his pressure at home and let me know if it remains elevated. We may need to start him on blood pressure medication.\par \par Fortunately the patient has no signs or symptoms of active heart disease.\par \par Pending the above I would see him in 3 months. He'll be difficult if he cannot take the Praluent because he has been intolerant to statin medication.

## 2020-02-18 ENCOUNTER — APPOINTMENT (OUTPATIENT)
Dept: CARDIOLOGY | Facility: CLINIC | Age: 70
End: 2020-02-18
Payer: MEDICARE

## 2020-02-18 ENCOUNTER — NON-APPOINTMENT (OUTPATIENT)
Age: 70
End: 2020-02-18

## 2020-02-18 VITALS
SYSTOLIC BLOOD PRESSURE: 146 MMHG | OXYGEN SATURATION: 98 % | WEIGHT: 180 LBS | BODY MASS INDEX: 28.93 KG/M2 | HEART RATE: 73 BPM | HEIGHT: 66 IN | DIASTOLIC BLOOD PRESSURE: 80 MMHG

## 2020-02-18 PROCEDURE — 99214 OFFICE O/P EST MOD 30 MIN: CPT

## 2020-02-18 NOTE — REVIEW OF SYSTEMS
[Eyeglasses] : currently wearing eyeglasses [Heartburn] : heartburn [Skin: A Rash] : rash: [Joint Pain] : joint pain [Negative] : Neurological [Fever] : no fever [Headache] : no headache [Chills] : no chills [Sore Throat] : no sore throat [Earache] : no earache [Feeling Fatigued] : not feeling fatigued [Sinus Pressure] : no sinus pressure [Anxiety] : no anxiety [Skin Lesions] : no skin lesions [Depression] : no depression [Easy Bleeding] : no tendency for easy bleeding [Excessive Thirst] : no polydipsia [Easy Bruising] : no tendency for easy bruising

## 2020-02-18 NOTE — PHYSICAL EXAM
[General Appearance - Well Developed] : well developed [Normal Appearance] : normal appearance [Well Groomed] : well groomed [General Appearance - Well Nourished] : well nourished [No Deformities] : no deformities [General Appearance - In No Acute Distress] : no acute distress [Normal Conjunctiva] : the conjunctiva exhibited no abnormalities [Normal Oral Mucosa] : normal oral mucosa [Normal Jugular Venous A Waves Present] : normal jugular venous A waves present [Normal Jugular Venous V Waves Present] : normal jugular venous V waves present [No Jugular Venous Alegria A Waves] : no jugular venous alegria A waves [Exaggerated Use Of Accessory Muscles For Inspiration] : no accessory muscle use [Respiration, Rhythm And Depth] : normal respiratory rhythm and effort [Auscultation Breath Sounds / Voice Sounds] : lungs were clear to auscultation bilaterally [Bowel Sounds] : normal bowel sounds [Abdomen Soft] : soft [Abdomen Tenderness] : non-tender [Abnormal Walk] : normal gait [Cyanosis, Localized] : no localized cyanosis [Gait - Sufficient For Exercise Testing] : the gait was sufficient for exercise testing [Nail Clubbing] : no clubbing of the fingernails [Skin Color & Pigmentation] : normal skin color and pigmentation [Skin Turgor] : normal skin turgor [] : no rash [Oriented To Time, Place, And Person] : oriented to person, place, and time [Impaired Insight] : insight and judgment were intact [No Anxiety] : not feeling anxious [5th Left ICS - MCL] : palpated at the 5th LICS in the midclavicular line [Normal] : normal [Normal Rate] : normal [No Precordial Heave] : no precordial heave was noted [Normal S2] : normal S2 [Normal S1] : normal S1 [No Murmur] : no murmurs heard [2+] : left 2+ [No Abnormalities] : the abdominal aorta was not enlarged and no bruit was heard [No Pitting Edema] : no pitting edema present [S4] : no S4 [Apical Thrill] : no thrill palpable at the apex [S3] : no S3 [Right Carotid Bruit] : no bruit heard over the right carotid [Right Femoral Bruit] : no bruit heard over the right femoral artery [Left Carotid Bruit] : no bruit heard over the left carotid [Left Femoral Bruit] : no bruit heard over the left femoral artery

## 2020-02-18 NOTE — DISCUSSION/SUMMARY
[FreeTextEntry1] : The patient's cardiac status remained stable without any signs or symptoms of active heart disease. He has gained a little weight and his blood pressure is mildly elevated. Is intolerant to statin drugs and can no longer take Praluent because of side effects\par \par He will start exercising again and try to lose weight he has gained. We'll check his blood work and will come in for repeat stress test. Pending the above I would see him in 6 months.

## 2020-02-18 NOTE — HISTORY OF PRESENT ILLNESS
[FreeTextEntry1] : I saw Erickson Schulz in the office today for a followup visit. He is a 70-year-old white male being treated for hypertension and hyperlipidemia. He has a family history of ischemic heart disease with mother and dad having heart problems in their early 60s.\par \par The patient exercises regularly and has no chest pain or shortness of breath. There is a history of prostate cancer. Status post radical prostatectomy about 10 years ago and is considered to be cured. Patient also has a tremor of his right arm and has been diagnosed with early Parkinson's disease. He is taking Azilect..\par \par The patient had a cardiac calcium score performed 2/16. The total score was 573, , circumflex 165, and RCA 70.\par \par The patient had a regular stress test 6/15 that showed no ischemia to a workload of 10 Mets. Had a carotid Doppler in January 2014 that showed mild nonobstructive plaque, without change compared to 2011.\par \par Recent blood work dated 5/19 demonstrates a total cholesterol 144, triglycerides 37, HDL 70, and LDL 60.. The patient has a lot of body aches. Intolerant to statin medication Does have spinal stenosis and a lot of arthritis.He is now on Repatha.\par \par He underwent spinal surgery in the fall. He had a synovial cyst. This went well but he has been sedentary during the recovery period also has stopped his Lipitor which he claims has been giving him a lot of body aches.\par \par Over the course of the past several years, he's gained more than 10 pounds. His blood pressure is elevated today which he says happens when he gains weight. He has been complaining of a slight fluttering in his chest that lasts for a few seconds and happens every day. He has no hemodynamic symptoms with this palpitation\par \par He had a Holter monitor performed 3/19. Heart rate between 48 and 106 with an average rate of 71. 36 VPCs, 134 APCs, one couplet, and one triplet.\par \par Since he's been on Praulent he ángel developed mild bilateral leg edema and some joint swelling and pain which he attributes to the medication. He didn't take his Sunday's dose. In addition, he never scheduled a stress test because he sprained his ankle. He has been less active as a result has continued to gain weight.\par

## 2020-06-30 ENCOUNTER — APPOINTMENT (OUTPATIENT)
Dept: CARDIOLOGY | Facility: CLINIC | Age: 70
End: 2020-06-30
Payer: MEDICARE

## 2020-06-30 PROCEDURE — 93015 CV STRESS TEST SUPVJ I&R: CPT

## 2020-07-02 ENCOUNTER — APPOINTMENT (OUTPATIENT)
Dept: CARDIOLOGY | Facility: CLINIC | Age: 70
End: 2020-07-02
Payer: MEDICARE

## 2020-07-02 VITALS
DIASTOLIC BLOOD PRESSURE: 80 MMHG | WEIGHT: 172 LBS | HEIGHT: 66 IN | BODY MASS INDEX: 27.64 KG/M2 | SYSTOLIC BLOOD PRESSURE: 152 MMHG | OXYGEN SATURATION: 97 % | HEART RATE: 68 BPM

## 2020-07-02 DIAGNOSIS — R09.89 OTHER SPECIFIED SYMPTOMS AND SIGNS INVOLVING THE CIRCULATORY AND RESPIRATORY SYSTEMS: ICD-10-CM

## 2020-07-02 PROCEDURE — 99215 OFFICE O/P EST HI 40 MIN: CPT

## 2020-07-02 NOTE — REASON FOR VISIT
[Follow-Up - Clinic] : a clinic follow-up of [Carotid Artery Stenosis] : carotid stenosis [Hyperlipidemia] : hyperlipidemia [Hypertension] : hypertension [Coronary Artery Disease] : coronary artery disease [FreeTextEntry1] : Family history of ischemic heart disease

## 2020-07-02 NOTE — HISTORY OF PRESENT ILLNESS
[FreeTextEntry1] : I saw Erickson Schulz in the office today for a followup visit. He is a 70-year-old white male being treated for hypertension and hyperlipidemia. He has a family history of ischemic heart disease with mother and dad having heart problems in their early 60s.\par \par The patient exercises regularly and has no chest pain or shortness of breath. There is a history of prostate cancer. Status post radical prostatectomy about 10 years ago and is considered to be cured. Patient also has a tremor of his right arm and has been diagnosed with early Parkinson's disease. He is taking Azilect..\par \par The patient had a cardiac calcium score performed 2/16. The total score was 573, , circumflex 165, and RCA 70.\par \par The patient had a regular stress test 6/15 that showed no ischemia to a workload of 10 Mets. Had a carotid Doppler in January 2014 that showed mild nonobstructive plaque, without change compared to 2011.Repeat stress test 6/20was normal but to a low workload of 6 minutes. Peak blood pressure was 220/82.At home his blood pressure was between 130 and 140/80.\par \par Recent blood work dated 5/19 demonstrates a total cholesterol 144, triglycerides 37, HDL 70, and LDL 60.. The patient has a lot of body aches. Intolerant to statin medication Does have spinal stenosis and a lot of arthritis.He is now on Repatha.\par \par He underwent spinal surgery in the fall. He had a synovial cyst. This went well but he has been sedentary during the recovery period also has stopped his Lipitor which he claims has been giving him a lot of body aches.\par \par Over the course of the past several years, he's gained more than 10 pounds. His blood pressure is elevated today which he says happens when he gains weight. He has been complaining of a slight fluttering in his chest that lasts for a few seconds and happens every day. He has no hemodynamic symptoms with this palpitation\par \par He had a Holter monitor performed 3/19. Heart rate between 48 and 106 with an average rate of 71. 36 VPCs, 134 APCs, one couplet, and one triplet.\par \par Since he's been on Praulent he has developed mild bilateral leg edema and some joint swelling and pain which he attributes to the medication. He had to stop this medication.\par

## 2020-07-02 NOTE — PHYSICAL EXAM
[General Appearance - Well Developed] : well developed [Normal Appearance] : normal appearance [Well Groomed] : well groomed [General Appearance - Well Nourished] : well nourished [No Deformities] : no deformities [General Appearance - In No Acute Distress] : no acute distress [Normal Oral Mucosa] : normal oral mucosa [Normal Conjunctiva] : the conjunctiva exhibited no abnormalities [Normal Jugular Venous A Waves Present] : normal jugular venous A waves present [Normal Jugular Venous V Waves Present] : normal jugular venous V waves present [No Jugular Venous Alegria A Waves] : no jugular venous alegria A waves [Respiration, Rhythm And Depth] : normal respiratory rhythm and effort [Exaggerated Use Of Accessory Muscles For Inspiration] : no accessory muscle use [Auscultation Breath Sounds / Voice Sounds] : lungs were clear to auscultation bilaterally [Abdomen Soft] : soft [Bowel Sounds] : normal bowel sounds [Abnormal Walk] : normal gait [Abdomen Tenderness] : non-tender [Gait - Sufficient For Exercise Testing] : the gait was sufficient for exercise testing [Cyanosis, Localized] : no localized cyanosis [Nail Clubbing] : no clubbing of the fingernails [] : no rash [Skin Turgor] : normal skin turgor [Skin Color & Pigmentation] : normal skin color and pigmentation [Impaired Insight] : insight and judgment were intact [Oriented To Time, Place, And Person] : oriented to person, place, and time [5th Left ICS - MCL] : palpated at the 5th LICS in the midclavicular line [No Anxiety] : not feeling anxious [No Precordial Heave] : no precordial heave was noted [Normal] : normal [Normal Rate] : normal [Normal S2] : normal S2 [Normal S1] : normal S1 [No Murmur] : no murmurs heard [2+] : left 2+ [No Abnormalities] : the abdominal aorta was not enlarged and no bruit was heard [No Pitting Edema] : no pitting edema present [Apical Thrill] : no thrill palpable at the apex [S3] : no S3 [S4] : no S4 [Right Carotid Bruit] : no bruit heard over the right carotid [Left Carotid Bruit] : no bruit heard over the left carotid [Right Femoral Bruit] : no bruit heard over the right femoral artery [Left Femoral Bruit] : no bruit heard over the left femoral artery

## 2020-07-02 NOTE — DISCUSSION/SUMMARY
[FreeTextEntry1] : I am going to start the patient on losartan 25 mg once a day for his blood pressure. Since he could not tolerate the probably an orthostatic drug he will try red yeast rice 600 mg twice a day. He will not start both these medications at the same time. He tolerates them and repeat his blood work in 3 months and see him in 4 months.\par \par He will schedule a carotid Doppler and echocardiogram. If he does have problems he will call me. I would appreciate your sending a copy of his most recent blood work for my review.\par \par The patient is physically active and has no exertional symptoms.

## 2020-08-18 ENCOUNTER — APPOINTMENT (OUTPATIENT)
Dept: CARDIOLOGY | Facility: CLINIC | Age: 70
End: 2020-08-18
Payer: MEDICARE

## 2020-08-18 PROCEDURE — 93880 EXTRACRANIAL BILAT STUDY: CPT

## 2020-08-18 PROCEDURE — 93306 TTE W/DOPPLER COMPLETE: CPT

## 2020-11-17 ENCOUNTER — APPOINTMENT (OUTPATIENT)
Dept: CARDIOLOGY | Facility: CLINIC | Age: 70
End: 2020-11-17
Payer: MEDICARE

## 2020-11-17 VITALS
WEIGHT: 172 LBS | DIASTOLIC BLOOD PRESSURE: 76 MMHG | OXYGEN SATURATION: 97 % | HEIGHT: 66 IN | HEART RATE: 72 BPM | BODY MASS INDEX: 27.64 KG/M2 | SYSTOLIC BLOOD PRESSURE: 144 MMHG

## 2020-11-17 PROCEDURE — 99214 OFFICE O/P EST MOD 30 MIN: CPT

## 2020-11-17 NOTE — HISTORY OF PRESENT ILLNESS
[FreeTextEntry1] : I saw Erickson Schulz in the office today for a followup visit. He is a 70-year-old white male being treated for hypertension and hyperlipidemia. He has a family history of ischemic heart disease with mother and dad having heart problems in their early 60s.\par \par The patient exercises regularly and has no chest pain or shortness of breath. There is a history of prostate cancer. Status post radical prostatectomy about 10 years ago and is considered to be cured. Patient also has a tremor of his right arm and has been diagnosed with early Parkinson's disease. He is taking Azilect..\par \par The patient had a cardiac calcium score performed 2/16. The total score was 573, , circumflex 165, and RCA 70.\par \par The patient had a regular stress test 6/15 that showed no ischemia to a workload of 10 Mets. Had a carotid Doppler 8/20 that showed mild nonobstructive plaque, without change compared to 2014.Repeat stress test 6/20 was normal but to a low workload of 6 minutes. Peak blood pressure was 220/82.At home his blood pressure was between 130 and 140/80.Echocardiogram 8/20 demonstrated an ejection fraction of 55-60% with mild MR.\par \par Recent blood work dated 5/20 demonstrates a total cholesterol 230, triglycerides 31, HDL 76, and .. The patient has a lot of body aches. Intolerant to statin medication Does have spinal stenosis and a lot of arthritis.He off  Repatha. So far he is tolerating red yeast rice twice a day.\par \par He underwent spinal surgery in the fall. He had a synovial cyst. This went well but he has been sedentary during the recovery period.\par \par He had a Holter monitor performed 3/19. Heart rate between 48 and 106 with an average rate of 71. 36 VPCs, 134 APCs, one couplet, and one triplet.\par \par He has noticed slight ankle edema after he exercises. He has no chest pain, shortness of breath, palpitation..\par

## 2020-11-17 NOTE — DISCUSSION/SUMMARY
[FreeTextEntry1] : The patient is doing well without any signs or symptoms of active heart disease. Blood pressure seems to be well-controlled. He exercises regularly and denies any chest pain or shortness of breath. Discussed results of his carotid Doppler and echo.  He is now tolerating red yeast rice and he'll do general blood work. Also going to start zetia 10 mg once a day as an add-on to the red yeast rice..\par \par He has been on omeprazole for many years and he will try Pepcid. Other medication will remain the same.\par \par We went over his lifestyle and his medications, and I answered his questions. I will see him in 6 months.

## 2020-11-17 NOTE — REASON FOR VISIT
[Follow-Up - Clinic] : a clinic follow-up of [Carotid Artery Stenosis] : carotid stenosis [Coronary Artery Disease] : coronary artery disease [Hyperlipidemia] : hyperlipidemia [Hypertension] : hypertension [FreeTextEntry1] : Family history of ischemic heart disease

## 2020-11-18 LAB
25(OH)D3 SERPL-MCNC: 32.7 NG/ML
ALBUMIN SERPL ELPH-MCNC: 4.4 G/DL
ALP BLD-CCNC: 54 U/L
ALT SERPL-CCNC: 15 U/L
ANION GAP SERPL CALC-SCNC: 11 MMOL/L
AST SERPL-CCNC: 19 U/L
BASOPHILS # BLD AUTO: 0.06 K/UL
BASOPHILS NFR BLD AUTO: 0.9 %
BILIRUB SERPL-MCNC: 0.4 MG/DL
BUN SERPL-MCNC: 25 MG/DL
CALCIUM SERPL-MCNC: 9.8 MG/DL
CHLORIDE SERPL-SCNC: 104 MMOL/L
CHOLEST SERPL-MCNC: 203 MG/DL
CO2 SERPL-SCNC: 26 MMOL/L
CREAT SERPL-MCNC: 1.28 MG/DL
EOSINOPHIL # BLD AUTO: 0.43 K/UL
EOSINOPHIL NFR BLD AUTO: 6.7 %
GLUCOSE SERPL-MCNC: 94 MG/DL
HCT VFR BLD CALC: 44.1 %
HDLC SERPL-MCNC: 67 MG/DL
HGB BLD-MCNC: 14.2 G/DL
IMM GRANULOCYTES NFR BLD AUTO: 0 %
LDLC SERPL CALC-MCNC: 127 MG/DL
LYMPHOCYTES # BLD AUTO: 2.42 K/UL
LYMPHOCYTES NFR BLD AUTO: 37.7 %
MAN DIFF?: NORMAL
MCHC RBC-ENTMCNC: 29.8 PG
MCHC RBC-ENTMCNC: 32.2 GM/DL
MCV RBC AUTO: 92.6 FL
MONOCYTES # BLD AUTO: 0.6 K/UL
MONOCYTES NFR BLD AUTO: 9.3 %
NEUTROPHILS # BLD AUTO: 2.91 K/UL
NEUTROPHILS NFR BLD AUTO: 45.4 %
NONHDLC SERPL-MCNC: 135 MG/DL
PLATELET # BLD AUTO: 237 K/UL
POTASSIUM SERPL-SCNC: 5.4 MMOL/L
PROT SERPL-MCNC: 7 G/DL
PSA SERPL-MCNC: <0.01 NG/ML
RBC # BLD: 4.76 M/UL
RBC # FLD: 12.7 %
SODIUM SERPL-SCNC: 141 MMOL/L
TRIGL SERPL-MCNC: 41 MG/DL
WBC # FLD AUTO: 6.42 K/UL

## 2020-12-14 ENCOUNTER — RX RENEWAL (OUTPATIENT)
Age: 70
End: 2020-12-14

## 2021-03-01 NOTE — ED PROVIDER NOTE - NS ED ROS FT
Constitutional: No fever or chills  Eyes: No visual changes  HEENT: No throat pain  CV: No chest pain  Resp: No SOB no cough  GI: No abd pain, nausea or vomiting  : No dysuria  MSK: No musculoskeletal pain  Skin: No rash  Neuro: No headache Awake/Alert

## 2021-03-15 ENCOUNTER — RX RENEWAL (OUTPATIENT)
Age: 71
End: 2021-03-15

## 2021-04-10 ENCOUNTER — EMERGENCY (EMERGENCY)
Facility: HOSPITAL | Age: 71
LOS: 0 days | Discharge: HOME | End: 2021-04-10
Attending: EMERGENCY MEDICINE
Payer: MEDICARE

## 2021-04-10 VITALS
TEMPERATURE: 98 F | DIASTOLIC BLOOD PRESSURE: 72 MMHG | HEART RATE: 69 BPM | RESPIRATION RATE: 18 BRPM | SYSTOLIC BLOOD PRESSURE: 120 MMHG | OXYGEN SATURATION: 99 %

## 2021-04-10 VITALS — HEIGHT: 66 IN | WEIGHT: 154.98 LBS

## 2021-04-10 DIAGNOSIS — M71.30 OTHER BURSAL CYST, UNSPECIFIED SITE: Chronic | ICD-10-CM

## 2021-04-10 DIAGNOSIS — Z90.79 ACQUIRED ABSENCE OF OTHER GENITAL ORGAN(S): Chronic | ICD-10-CM

## 2021-04-10 DIAGNOSIS — G20 PARKINSON'S DISEASE: ICD-10-CM

## 2021-04-10 DIAGNOSIS — E78.5 HYPERLIPIDEMIA, UNSPECIFIED: ICD-10-CM

## 2021-04-10 DIAGNOSIS — R05 COUGH: ICD-10-CM

## 2021-04-10 DIAGNOSIS — S89.90XA UNSPECIFIED INJURY OF UNSPECIFIED LOWER LEG, INITIAL ENCOUNTER: Chronic | ICD-10-CM

## 2021-04-10 DIAGNOSIS — R06.02 SHORTNESS OF BREATH: ICD-10-CM

## 2021-04-10 DIAGNOSIS — Z79.82 LONG TERM (CURRENT) USE OF ASPIRIN: ICD-10-CM

## 2021-04-10 DIAGNOSIS — H26.9 UNSPECIFIED CATARACT: ICD-10-CM

## 2021-04-10 LAB
ALBUMIN SERPL ELPH-MCNC: 3.4 G/DL — SIGNIFICANT CHANGE UP (ref 3.3–5)
ALP SERPL-CCNC: 55 U/L — SIGNIFICANT CHANGE UP (ref 40–120)
ALT FLD-CCNC: 21 U/L — SIGNIFICANT CHANGE UP (ref 12–78)
ANION GAP SERPL CALC-SCNC: 2 MMOL/L — LOW (ref 5–17)
APTT BLD: 34 SEC — SIGNIFICANT CHANGE UP (ref 27.5–35.5)
AST SERPL-CCNC: 21 U/L — SIGNIFICANT CHANGE UP (ref 15–37)
BASOPHILS # BLD AUTO: 0.06 K/UL — SIGNIFICANT CHANGE UP (ref 0–0.2)
BASOPHILS NFR BLD AUTO: 1 % — SIGNIFICANT CHANGE UP (ref 0–2)
BILIRUB SERPL-MCNC: 0.4 MG/DL — SIGNIFICANT CHANGE UP (ref 0.2–1.2)
BUN SERPL-MCNC: 25 MG/DL — HIGH (ref 7–23)
CALCIUM SERPL-MCNC: 8.7 MG/DL — SIGNIFICANT CHANGE UP (ref 8.5–10.1)
CHLORIDE SERPL-SCNC: 112 MMOL/L — HIGH (ref 96–108)
CO2 SERPL-SCNC: 28 MMOL/L — SIGNIFICANT CHANGE UP (ref 22–31)
CREAT SERPL-MCNC: 1.11 MG/DL — SIGNIFICANT CHANGE UP (ref 0.5–1.3)
EOSINOPHIL # BLD AUTO: 0.44 K/UL — SIGNIFICANT CHANGE UP (ref 0–0.5)
EOSINOPHIL NFR BLD AUTO: 7 % — HIGH (ref 0–6)
GLUCOSE SERPL-MCNC: 116 MG/DL — HIGH (ref 70–99)
HCT VFR BLD CALC: 39 % — SIGNIFICANT CHANGE UP (ref 39–50)
HGB BLD-MCNC: 12.9 G/DL — LOW (ref 13–17)
IMM GRANULOCYTES NFR BLD AUTO: 0.2 % — SIGNIFICANT CHANGE UP (ref 0–1.5)
INR BLD: 0.99 RATIO — SIGNIFICANT CHANGE UP (ref 0.88–1.16)
LYMPHOCYTES # BLD AUTO: 2.19 K/UL — SIGNIFICANT CHANGE UP (ref 1–3.3)
LYMPHOCYTES # BLD AUTO: 34.8 % — SIGNIFICANT CHANGE UP (ref 13–44)
MAGNESIUM SERPL-MCNC: 2.3 MG/DL — SIGNIFICANT CHANGE UP (ref 1.6–2.6)
MCHC RBC-ENTMCNC: 29.6 PG — SIGNIFICANT CHANGE UP (ref 27–34)
MCHC RBC-ENTMCNC: 33.1 GM/DL — SIGNIFICANT CHANGE UP (ref 32–36)
MCV RBC AUTO: 89.4 FL — SIGNIFICANT CHANGE UP (ref 80–100)
MONOCYTES # BLD AUTO: 0.58 K/UL — SIGNIFICANT CHANGE UP (ref 0–0.9)
MONOCYTES NFR BLD AUTO: 9.2 % — SIGNIFICANT CHANGE UP (ref 2–14)
NEUTROPHILS # BLD AUTO: 3.01 K/UL — SIGNIFICANT CHANGE UP (ref 1.8–7.4)
NEUTROPHILS NFR BLD AUTO: 47.8 % — SIGNIFICANT CHANGE UP (ref 43–77)
NT-PROBNP SERPL-SCNC: 62 PG/ML — SIGNIFICANT CHANGE UP (ref 0–125)
PLATELET # BLD AUTO: 231 K/UL — SIGNIFICANT CHANGE UP (ref 150–400)
POTASSIUM SERPL-MCNC: 3.5 MMOL/L — SIGNIFICANT CHANGE UP (ref 3.5–5.3)
POTASSIUM SERPL-SCNC: 3.5 MMOL/L — SIGNIFICANT CHANGE UP (ref 3.5–5.3)
PROT SERPL-MCNC: 6.6 GM/DL — SIGNIFICANT CHANGE UP (ref 6–8.3)
PROTHROM AB SERPL-ACNC: 11.6 SEC — SIGNIFICANT CHANGE UP (ref 10.6–13.6)
RAPID RVP RESULT: SIGNIFICANT CHANGE UP
RBC # BLD: 4.36 M/UL — SIGNIFICANT CHANGE UP (ref 4.2–5.8)
RBC # FLD: 13.1 % — SIGNIFICANT CHANGE UP (ref 10.3–14.5)
SARS-COV-2 RNA SPEC QL NAA+PROBE: SIGNIFICANT CHANGE UP
SODIUM SERPL-SCNC: 142 MMOL/L — SIGNIFICANT CHANGE UP (ref 135–145)
TROPONIN I SERPL-MCNC: <0.015 NG/ML — SIGNIFICANT CHANGE UP (ref 0.01–0.04)
WBC # BLD: 6.29 K/UL — SIGNIFICANT CHANGE UP (ref 3.8–10.5)
WBC # FLD AUTO: 6.29 K/UL — SIGNIFICANT CHANGE UP (ref 3.8–10.5)

## 2021-04-10 PROCEDURE — 36415 COLL VENOUS BLD VENIPUNCTURE: CPT

## 2021-04-10 PROCEDURE — 99285 EMERGENCY DEPT VISIT HI MDM: CPT | Mod: CS

## 2021-04-10 PROCEDURE — 85025 COMPLETE CBC W/AUTO DIFF WBC: CPT

## 2021-04-10 PROCEDURE — 99284 EMERGENCY DEPT VISIT MOD MDM: CPT | Mod: 25

## 2021-04-10 PROCEDURE — 85610 PROTHROMBIN TIME: CPT

## 2021-04-10 PROCEDURE — 83880 ASSAY OF NATRIURETIC PEPTIDE: CPT

## 2021-04-10 PROCEDURE — 71045 X-RAY EXAM CHEST 1 VIEW: CPT | Mod: 26

## 2021-04-10 PROCEDURE — 0225U NFCT DS DNA&RNA 21 SARSCOV2: CPT

## 2021-04-10 PROCEDURE — 83735 ASSAY OF MAGNESIUM: CPT

## 2021-04-10 PROCEDURE — 93010 ELECTROCARDIOGRAM REPORT: CPT

## 2021-04-10 PROCEDURE — 80053 COMPREHEN METABOLIC PANEL: CPT

## 2021-04-10 PROCEDURE — 84484 ASSAY OF TROPONIN QUANT: CPT

## 2021-04-10 PROCEDURE — 93005 ELECTROCARDIOGRAM TRACING: CPT

## 2021-04-10 PROCEDURE — 85730 THROMBOPLASTIN TIME PARTIAL: CPT

## 2021-04-10 PROCEDURE — 71045 X-RAY EXAM CHEST 1 VIEW: CPT

## 2021-04-10 NOTE — ED ADULT NURSE REASSESSMENT NOTE - NS ED NURSE REASSESS COMMENT FT1
Pt resting comfortably in bed with no acute distress noted. Pt updated on their status, the current plan of care, and available results no needs or requests at this time. Call bell within reach. Will continue to monitor/reassess.

## 2021-04-10 NOTE — ED ADULT NURSE NOTE - OBJECTIVE STATEMENT
pt biba s/p coughing and shortness of breath that woke pt up from sleep this evening. Pt endorses headache all day today. Pt denies fever/chills/NVD. Pt had recent covid exposure while attending [employer] approximately 10 days ago. pt on the monitor, reports resolution of symptoms. will ctm

## 2021-04-10 NOTE — ED PROVIDER NOTE - PSH
H/O radical prostatectomy  2003  Knee injury  Arthroscopy 1994 due to soccer injury  Synovial cyst  removed from back and LEFT HAND

## 2021-04-10 NOTE — ED PROVIDER NOTE - OBJECTIVE STATEMENT
70 y/o M with h/o PD, HLD p/w sudden onset of coughing and brief resolved SOB that woke him about 1 hour PTA.  Pt states he had a spicy dinner and 1 alcoholic drink a couple hours before going to sleep.  Pt denies CP, SOB currently, f/c/r, n/v/d, abdominal pain.  No leg pain/swelling. No recent travel or sick contacts.

## 2021-04-10 NOTE — ED PROVIDER NOTE - PATIENT PORTAL LINK FT
You can access the FollowMyHealth Patient Portal offered by [facility] by registering at the following website: [email or web address]. By joining [facility]’s FollowMyHealth portal, you will also be able to view your health information using other applications (apps) compatible with our system.

## 2021-04-10 NOTE — ED PROVIDER NOTE - CLINICAL SUMMARY MEDICAL DECISION MAKING FREE TEXT BOX
Pt p/w brief episode of cough and SOB now resolved.  Plan: CXR, cardiac monitor, EKG, troponin, reassess

## 2021-04-26 ENCOUNTER — RX RENEWAL (OUTPATIENT)
Age: 71
End: 2021-04-26

## 2021-04-28 ENCOUNTER — APPOINTMENT (OUTPATIENT)
Dept: CARDIOLOGY | Facility: CLINIC | Age: 71
End: 2021-04-28
Payer: MEDICARE

## 2021-04-28 ENCOUNTER — NON-APPOINTMENT (OUTPATIENT)
Age: 71
End: 2021-04-28

## 2021-04-28 VITALS
DIASTOLIC BLOOD PRESSURE: 80 MMHG | HEIGHT: 66 IN | BODY MASS INDEX: 27.8 KG/M2 | SYSTOLIC BLOOD PRESSURE: 176 MMHG | HEART RATE: 78 BPM | OXYGEN SATURATION: 99 % | WEIGHT: 173 LBS

## 2021-04-28 LAB
CK SERPL-CCNC: 114 U/L
CRP SERPL HS-MCNC: 0.4 MG/L

## 2021-04-28 PROCEDURE — 99214 OFFICE O/P EST MOD 30 MIN: CPT

## 2021-04-28 PROCEDURE — 93000 ELECTROCARDIOGRAM COMPLETE: CPT

## 2021-04-28 NOTE — DISCUSSION/SUMMARY
[FreeTextEntry1] : The patient's cardiac status is stable.  He exercises regularly and has no chest pain or shortness of breath.  He is now on Zetia and red yeast rice.  We will repeat his cholesterol blood test.  Is been intolerant to all of the cholesterol medications.\par \par He will follow up with his neurologist about his Parkinson's disease.  He has any exertional symptoms he would call me.  If all is well I will see him in 6 months.\par \par We did go over his medications, and his ER record, and I answered all of his questions.

## 2021-04-28 NOTE — REVIEW OF SYSTEMS
[Heartburn] : heartburn [Tremor] : a tremor was seen [Negative] : Genitourinary [Joint Pain] : no joint pain [Rash] : no rash [Dizziness] : no dizziness [Depression] : no depression [Anxiety] : no anxiety [Easy Bleeding] : no tendency for easy bleeding [Easy Bruising] : no tendency for easy bruising

## 2021-04-28 NOTE — HISTORY OF PRESENT ILLNESS
[FreeTextEntry1] : I saw Erickson Schulz in the office today for a followup visit. He is a 71-year-old white male being treated for hypertension and hyperlipidemia. He has a family history of ischemic heart disease with mother and dad having heart problems in their early 60s.\par \par The patient exercises regularly and has no chest pain or shortness of breath. There is a history of prostate cancer. Status post radical prostatectomy about 10 years ago and is considered to be cured. Patient also has a tremor of his right arm and has been diagnosed with early Parkinson's disease. He is taking Azilect..\par \par The patient had a cardiac calcium score performed 2/16. The total score was 573, , circumflex 165, and RCA 70.\par \par The patient had a regular stress test 6/15 that showed no ischemia to a workload of 10 Mets. Had a carotid Doppler 8/20 that showed mild nonobstructive plaque, without change compared to 2014.Repeat stress test 6/20 was normal but to a low workload of 6 minutes. Peak blood pressure was 220/82.At home his blood pressure was between 130 and 140/80.Echocardiogram 8/20 demonstrated an ejection fraction of 55-60% with mild MR.\par \par Recent blood work dated 11/20 demonstrates a total cholesterol 203, triglycerides 41, HDL 67, and .. The patient has a lot of body aches. Intolerant to statin medication Does have spinal stenosis and a lot of arthritis.He off  Repatha. So far he is tolerating red yeast rice twice a day.\par \par He underwent spinal surgery in the fall. He had a synovial cyst. This went well but he has been sedentary during the recovery period.\par \par He had a Holter monitor performed 3/19. Heart rate between 48 and 106 with an average rate of 71. 36 VPCs, 134 APCs, one couplet, and one triplet.\par \par He has noticed slight ankle edema after he exercises. He has no chest pain, shortness of breath, palpitation..\par \katelynn Was recently seen in the emergency room at [facility] for shortness of breath.  He woke up with the symptom.  Everything was negative including chest x-ray and he was sent home.  Has not happened since that time.  He has gained some weight and his Parkinson's disease has gotten worse.\par \par ECG demonstrates sinus rhythm and remains normal.\par

## 2021-04-30 LAB
ALBUMIN SERPL ELPH-MCNC: 4.7 G/DL
ALP BLD-CCNC: 62 U/L
ALT SERPL-CCNC: 15 U/L
ANION GAP SERPL CALC-SCNC: 17 MMOL/L
AST SERPL-CCNC: 27 U/L
BILIRUB SERPL-MCNC: 0.6 MG/DL
BUN SERPL-MCNC: 22 MG/DL
CALCIUM SERPL-MCNC: 9.8 MG/DL
CHLORIDE SERPL-SCNC: 107 MMOL/L
CHOLEST SERPL-MCNC: 157 MG/DL
CO2 SERPL-SCNC: 19 MMOL/L
CREAT SERPL-MCNC: 1.15 MG/DL
GLUCOSE SERPL-MCNC: 92 MG/DL
HDLC SERPL-MCNC: 74 MG/DL
LDLC SERPL CALC-MCNC: 75 MG/DL
LDLC SERPL DIRECT ASSAY-MCNC: 77 MG/DL
NONHDLC SERPL-MCNC: 83 MG/DL
POTASSIUM SERPL-SCNC: 4.4 MMOL/L
PROT SERPL-MCNC: 7.5 G/DL
PSA SERPL-MCNC: <0.01 NG/ML
PSA SERPL-MCNC: <0.01 NG/ML
SODIUM SERPL-SCNC: 143 MMOL/L
TRIGL SERPL-MCNC: 37 MG/DL

## 2021-05-08 ENCOUNTER — RX RENEWAL (OUTPATIENT)
Age: 71
End: 2021-05-08

## 2021-06-09 ENCOUNTER — APPOINTMENT (OUTPATIENT)
Dept: FAMILY MEDICINE | Facility: CLINIC | Age: 71
End: 2021-06-09
Payer: MEDICARE

## 2021-06-09 VITALS
HEART RATE: 82 BPM | DIASTOLIC BLOOD PRESSURE: 70 MMHG | WEIGHT: 171 LBS | OXYGEN SATURATION: 99 % | TEMPERATURE: 206.6 F | HEIGHT: 66 IN | BODY MASS INDEX: 27.48 KG/M2 | SYSTOLIC BLOOD PRESSURE: 130 MMHG

## 2021-06-09 DIAGNOSIS — Z78.9 OTHER SPECIFIED HEALTH STATUS: ICD-10-CM

## 2021-06-09 DIAGNOSIS — H53.9 UNSPECIFIED VISUAL DISTURBANCE: ICD-10-CM

## 2021-06-09 DIAGNOSIS — Z87.898 PERSONAL HISTORY OF OTHER SPECIFIED CONDITIONS: ICD-10-CM

## 2021-06-09 DIAGNOSIS — Z87.09 PERSONAL HISTORY OF OTHER DISEASES OF THE RESPIRATORY SYSTEM: ICD-10-CM

## 2021-06-09 PROCEDURE — G0439: CPT

## 2021-06-09 RX ORDER — OMEPRAZOLE 20 MG/1
20 CAPSULE, DELAYED RELEASE ORAL
Refills: 0 | Status: DISCONTINUED | COMMUNITY
Start: 2021-06-09 | End: 2021-06-09

## 2021-06-09 RX ORDER — RASAGILINE MESYLATE 0.5 MG/1
0.5 TABLET ORAL
Qty: 90 | Refills: 0 | Status: DISCONTINUED | COMMUNITY
Start: 2017-01-02 | End: 2021-06-09

## 2021-06-09 RX ORDER — ACETAMINOPHEN/DIPHENHYDRAMINE 500MG-25MG
TABLET ORAL
Refills: 0 | Status: ACTIVE | COMMUNITY
Start: 2021-06-09

## 2021-06-09 NOTE — HISTORY OF PRESENT ILLNESS
[FreeTextEntry1] : МАРИНА MELVIN is a 71 year old male here for a physical exam.\par  [de-identified] : His last PE was one year ago.\par His last tetanus shot was in 2012\par He has had Prevnar and Pneumovax \par He has had Zostavax. He has not had Shingrix. \par He has had the Pfizer COVID vaccine.\par His last dentist visit was within the past 6 months\par His last eye doctor appointment was within the past year. He has been having blurry vision due to an old racquetball injury\par His last dermatologist visit was within the past year\par His diet is healthy\par Exercise: calisthenics\par His last colonoscopy was 5/23/19

## 2021-06-09 NOTE — PHYSICAL EXAM
[No Acute Distress] : no acute distress [Well Nourished] : well nourished [Well Developed] : well developed [Well-Appearing] : well-appearing [Normal Sclera/Conjunctiva] : normal sclera/conjunctiva [PERRL] : pupils equal round and reactive to light [EOMI] : extraocular movements intact [Normal Outer Ear/Nose] : the outer ears and nose were normal in appearance [Normal Oropharynx] : the oropharynx was normal [No JVD] : no jugular venous distention [No Lymphadenopathy] : no lymphadenopathy [Supple] : supple [Thyroid Normal, No Nodules] : the thyroid was normal and there were no nodules present [No Respiratory Distress] : no respiratory distress  [No Accessory Muscle Use] : no accessory muscle use [Clear to Auscultation] : lungs were clear to auscultation bilaterally [Normal Rate] : normal rate  [Regular Rhythm] : with a regular rhythm [Normal S1, S2] : normal S1 and S2 [No Murmur] : no murmur heard [No Carotid Bruits] : no carotid bruits [Soft] : abdomen soft [Non Tender] : non-tender [Non-distended] : non-distended [No Masses] : no abdominal mass palpated [No HSM] : no HSM [Normal Bowel Sounds] : normal bowel sounds [Normal Posterior Cervical Nodes] : no posterior cervical lymphadenopathy [Normal Anterior Cervical Nodes] : no anterior cervical lymphadenopathy [No CVA Tenderness] : no CVA  tenderness [No Spinal Tenderness] : no spinal tenderness [No Joint Swelling] : no joint swelling [Grossly Normal Strength/Tone] : grossly normal strength/tone [No Rash] : no rash [Coordination Grossly Intact] : coordination grossly intact [No Focal Deficits] : no focal deficits [Normal Gait] : normal gait [Deep Tendon Reflexes (DTR)] : deep tendon reflexes were 2+ and symmetric [Normal Affect] : the affect was normal [Normal Insight/Judgement] : insight and judgment were intact [de-identified] : ankle edema

## 2021-06-09 NOTE — REVIEW OF SYSTEMS
[Negative] : Heme/Lymph [Vision Problems] : vision problems [Lower Ext Edema] : lower extremity edema [Joint Pain] : joint pain [de-identified] : tremor due to Parkinson's

## 2021-06-09 NOTE — ASSESSMENT
[FreeTextEntry1] : МАРИНА MELVIN is a 71 year old male here for a physical exam. Medical problems to be addressed today include hypercholesterolemia, hypertension, GERD, and Parkinson's. He is currently taking Azilect and Ropinirole but the Ropinirole will be changing to Carbidopa/Levodopa. Will be tapering off of Ropinirole as he increase the Sinemet over the next month.\par \par He had labs done by his cardiologist about a month ago. Results were reviewed with him today. Everything is stable so there is no need to repeat the labs today.

## 2021-06-09 NOTE — PLAN
[FreeTextEntry1] : Reviewed age-appropriate preventive screening tests with patient. He is due for [employer]. He will look into getting this at the pharmacy. \par \par Discussed clean eating (e.g. Mediterranean style diet) and recommendations for regular exercise/staying as physically active as possible. His cholesterol has been controlled with diet and REd Yeast Rice, and his cardiologist also recently added Zetia.\par \par Reviewed importance of good self care (e.g. meditation, yoga, adequate rest, regular exercise, magnesium, clean eating, etc.).\par

## 2021-06-10 ENCOUNTER — TRANSCRIPTION ENCOUNTER (OUTPATIENT)
Age: 71
End: 2021-06-10

## 2021-10-24 ENCOUNTER — NON-APPOINTMENT (OUTPATIENT)
Age: 71
End: 2021-10-24

## 2021-10-25 ENCOUNTER — NON-APPOINTMENT (OUTPATIENT)
Age: 71
End: 2021-10-25

## 2021-10-25 ENCOUNTER — APPOINTMENT (OUTPATIENT)
Dept: NEUROLOGY | Facility: CLINIC | Age: 71
End: 2021-10-25
Payer: MEDICARE

## 2021-10-25 VITALS
HEIGHT: 66 IN | WEIGHT: 171 LBS | HEART RATE: 78 BPM | TEMPERATURE: 97.5 F | BODY MASS INDEX: 27.48 KG/M2 | SYSTOLIC BLOOD PRESSURE: 124 MMHG | DIASTOLIC BLOOD PRESSURE: 74 MMHG

## 2021-10-25 PROCEDURE — 99214 OFFICE O/P EST MOD 30 MIN: CPT

## 2021-10-25 RX ORDER — ROPINIROLE 2 MG/1
2 TABLET, FILM COATED ORAL TWICE DAILY
Refills: 0 | Status: DISCONTINUED | COMMUNITY
End: 2021-10-25

## 2021-10-25 NOTE — DISCUSSION/SUMMARY
[FreeTextEntry1] : Mr. Schulz is a 71 year old man with a right hand tremor.\par He was diagnosed with Parkinson's Disease in 2015.\par \par Parkinson's Disease\par -Symptoms have remained fairly stable overtime.\par -Continue rasagiline 1 mg/day\par -Continue Ropinirole ER 6 mg BID\par -He does not tolerate carbidopa/levodopa\par -Continue daily exercise\par -He is noticing some mild difficulty with swallowing but is able to compensate by keeping his throat moist.\par If symptoms progress will send for modified barium swallow.\par \par Snoring, poor sleep\par -Will order home sleep study to evaluate for possible obstructive sleep apnea. CARTER may be contributing to some mild memory impairment.\par \par Dream Enactment Behavior\par -Rare episodes of dream enactment behavior.\par -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 It is not feasible for him to have a polysomnogram at this time. \par -Can try melatonin 3 mg at bedtime and titrate up to 9 mg if needed.\par -Discussed importance of a safe bedroom environment.\par \par Given recommendations for orthopedic shoulder specialists (Dr. Rowland and Dr. Carey).\par \par follow-up six months, sooner if needed.

## 2021-10-25 NOTE — DATA REVIEWED
[de-identified] : MRI brain without contrast 11/27/15: Mild volume loss with a few foci of increased T2 and FLAIR signal in the white matter. These are nonspecific and likely minimal microvascular ischemic change. There is no restricted diffusion to suggest new ischemic change. \par There is no acute hemorrhage or midline shift.

## 2021-10-25 NOTE — HISTORY OF PRESENT ILLNESS
[FreeTextEntry1] : Mr. Schulz is here today for neurology evaluation.\par He was previously seeing Dr. Andreas Benedict from 2015 to 2017 and then saw Dr. Willian Agarwal at [place].\par He was diagnosed with Parkinson's Disease in 2015.\par He has tremor in his right hand and has not had significant progression in symptoms.\par His rest tremor is still isolated to his right hand.\par He notes some mild issues with balance and goes to Pilates to help with balance.\par He denies having any falls.\par He remains very active.\par \par He has some mild difficulty swallowing, particularly if the food is dry.\par \par He states that sleep is terrible.\par He has shoulder issues affecting his sleep.\par His wife is an active sleeper.\par He does believe that he snores.\par He has had dream enactment once or twice a year - kicks while dreaming that he is playing soccer.\par \par He is under a lot of stress due to his wife's health issues.\par He feels that his mood is situational and describes himself as fatalistic.\par \par He notes some mild short term memory issues but remains independent in his ADLs and takes care of the household finances.\par \par \par Current PD meds:\par Rasagaline 1 mg/day\par Ropinirole 6 mg BID\par \par He denies having side effects with this medication.\par \par \par He was started on carbidopa/levodopa by Dr. Agarwal but does not tolerate it. He states that he is less functional when taking this medication.\par \par \par \par

## 2021-10-25 NOTE — CONSULT LETTER
[Dear  ___] : Dear  [unfilled], [Consult Letter:] : I had the pleasure of evaluating your patient, [unfilled]. [Please see my note below.] : Please see my note below. [Consult Closing:] : Thank you very much for allowing me to participate in the care of this patient.  If you have any questions, please do not hesitate to contact me. [FreeTextEntry2] : Miki Nolen [FreeTextEntry3] : Sincerely,\par \par \par Shruti Forde MD\par Diplomate, American Academy of Psychiatry and Neurology\par Board Certified in the Subspecialty of Clinical Neurophysiology\par Board Certified in the Subspecialty of Sleep Medicine\par Board Certified in the Subspecialty of Epilepsy\par

## 2021-10-25 NOTE — PHYSICAL EXAM
[FreeTextEntry1] : Examination:\par Constitutional: normal, no apparent distress\par Eyes: normal conjunctiva b/l, no ptosis, visual fields full\par Respiratory: no respiratory distress, normal effort, normal auscultation\par Cardiovascular: normal rate, rhythm, no murmurs\par Neck: supple, no masses\par Vascular: carotids normal\par Skin: normal color, no rashes\par Psych: normal mood, affect\par \par Neurological:\par Memory: normal memory, oriented to person, place, time\par Language intact/no aphasia\par Cranial Nerves: II-XII intact, Pupils equally round and reactive to light, ocular muscles/movements intact, no ptosis, no facial weakness, tongue protrudes normally in the midline, \par Motor: normal tone, no pronator drift, full strength in all four extremities in the proximal and distal muscle groups\par Coordination: Decreased amplitude and speed of rapid alternating movements on right. + rest tremor on right. No cogwheel rigidity. Finger to nose intact bilaterally\par Sensory: intact to light touch, vibration, joint position sense, negative Romberg examination\par DTRs: symmetric, 2+ in b/l triceps, 2+ in b/l biceps, 2+ in b/l brachioradialis, 2+ in bilateral patellars, 1+ in bilateral Achilles, Babinskis negative bilaterally\par Gait: narrow based, steady, good stride length and arm swing, some right hand tremor seen while walking, able to walk on heels, toes, tandem gait\par \par

## 2021-10-28 ENCOUNTER — APPOINTMENT (OUTPATIENT)
Dept: CARDIOLOGY | Facility: CLINIC | Age: 71
End: 2021-10-28
Payer: MEDICARE

## 2021-10-28 VITALS
SYSTOLIC BLOOD PRESSURE: 154 MMHG | BODY MASS INDEX: 27.64 KG/M2 | HEIGHT: 66 IN | DIASTOLIC BLOOD PRESSURE: 80 MMHG | WEIGHT: 172 LBS | OXYGEN SATURATION: 98 % | HEART RATE: 78 BPM

## 2021-10-28 PROCEDURE — 99214 OFFICE O/P EST MOD 30 MIN: CPT

## 2021-10-28 RX ORDER — ASPIRIN 81 MG
81 TABLET, DELAYED RELEASE (ENTERIC COATED) ORAL
Refills: 0 | Status: ACTIVE | COMMUNITY

## 2021-10-28 NOTE — DISCUSSION/SUMMARY
[FreeTextEntry1] : The patient's cardiac status remains stable. He is exercising and has no chest pain, shortness of breath, palpitation. Blood pressures well controlled. He will go for blood work today to check his cholesterol. Assuming the blood work is stable and he feels well I would see him again in 6 months.\par \par We did go over his medications, his previous blood work, his cardiac testing, and I answered all of his questions. If he does decide to have shoulder surgery he would let me know.

## 2021-10-28 NOTE — HISTORY OF PRESENT ILLNESS
[FreeTextEntry1] : I saw Erickson Schulz in the office today for a followup visit. He is a 71-year-old white male being treated for hypertension, hyperlipidemia, and elevated cardiac calcium score. He has a family history of ischemic heart disease with mother and dad having heart problems in their early 60s.\par \par The patient exercises regularly and has no chest pain or shortness of breath. There is a history of prostate cancer. Status post radical prostatectomy about 10 years ago and is considered to be cured. Patient also has a tremor of his right arm and has been diagnosed with early Parkinson's disease. He is taking Azilect..\par \par The patient had a cardiac calcium score performed 2/16. The total score was 573, , circumflex 165, and RCA 70.\par \par The patient had a regular stress test 6/15 that showed no ischemia to a workload of 10 Mets. Had a carotid Doppler 8/20 that showed mild nonobstructive plaque, without change compared to 2014.Repeat stress test 6/20 was normal but to a low workload of 6 minutes. Peak blood pressure was 220/82.At home his blood pressure was between 130 and 140/80.Echocardiogram 8/20 demonstrated an ejection fraction of 55-60% with mild MR.\par \par Blood work dated 11/21 demonstrates a total cholesterol of 157, triglycerides 37, HDL 74, and LDL 77... The patient has a lot of body aches. Intolerant to statin medication Does have spinal stenosis and a lot of arthritis.He off  Repatha. So far he is tolerating red yeast rice twice a day.\par \par He underwent spinal surgery in the fall. He had a synovial cyst. This went well but he has been sedentary during the recovery period.\par \par He had a Holter monitor performed 3/19. Heart rate between 48 and 106 with an average rate of 71. 36 VPCs, 134 APCs, one couplet, and one triplet.\par \par He has noticed slight ankle edema after he exercises. He has no chest pain, shortness of breath, palpitation..\par \katelynn Was recently seen in the emergency room at [facility] for shortness of breath.  He woke up with the symptom.  Everything was negative including chest x-ray and he was sent home.  Has not happened since that time.  He has gained some weight and his Parkinson's disease has gotten worse.\par \par He is developing increasing shoulder pain and loss of function and is considering shoulder surgery. Getting back to exercise and wants to try to get his weight back down to 165 pounds.\par

## 2021-10-28 NOTE — PHYSICAL EXAM
[General Appearance - Well Developed] : well developed [Normal Appearance] : normal appearance [Well Groomed] : well groomed [General Appearance - Well Nourished] : well nourished [No Deformities] : no deformities [General Appearance - In No Acute Distress] : no acute distress [Normal Conjunctiva] : the conjunctiva exhibited no abnormalities [Normal Oral Mucosa] : normal oral mucosa [Normal Jugular Venous A Waves Present] : normal jugular venous A waves present [Normal Jugular Venous V Waves Present] : normal jugular venous V waves present [No Jugular Venous Alegria A Waves] : no jugular venous alegria A waves [Respiration, Rhythm And Depth] : normal respiratory rhythm and effort [Exaggerated Use Of Accessory Muscles For Inspiration] : no accessory muscle use [Bowel Sounds] : normal bowel sounds [Auscultation Breath Sounds / Voice Sounds] : lungs were clear to auscultation bilaterally [Abdomen Soft] : soft [Abdomen Tenderness] : non-tender [Abnormal Walk] : normal gait [Gait - Sufficient For Exercise Testing] : the gait was sufficient for exercise testing [Nail Clubbing] : no clubbing of the fingernails [Cyanosis, Localized] : no localized cyanosis [Skin Color & Pigmentation] : normal skin color and pigmentation [Skin Turgor] : normal skin turgor [] : no rash [Oriented To Time, Place, And Person] : oriented to person, place, and time [Impaired Insight] : insight and judgment were intact [No Anxiety] : not feeling anxious [5th Left ICS - MCL] : palpated at the 5th LICS in the midclavicular line [Normal] : normal [No Precordial Heave] : no precordial heave was noted [Normal Rate] : normal [Normal S1] : normal S1 [Normal S2] : normal S2 [No Murmur] : no murmurs heard [2+] : left 2+ [No Abnormalities] : the abdominal aorta was not enlarged and no bruit was heard [No Pitting Edema] : no pitting edema present [Apical Thrill] : no thrill palpable at the apex [S3] : no S3 [S4] : no S4 [Right Carotid Bruit] : no bruit heard over the right carotid [Left Carotid Bruit] : no bruit heard over the left carotid [Right Femoral Bruit] : no bruit heard over the right femoral artery [Left Femoral Bruit] : no bruit heard over the left femoral artery

## 2021-10-29 LAB
25(OH)D3 SERPL-MCNC: 38.1 NG/ML
ALBUMIN SERPL ELPH-MCNC: 4.8 G/DL
ALP BLD-CCNC: 60 U/L
ALT SERPL-CCNC: 18 U/L
ANION GAP SERPL CALC-SCNC: 14 MMOL/L
APPEARANCE: CLEAR
AST SERPL-CCNC: 23 U/L
BACTERIA: NEGATIVE
BASOPHILS # BLD AUTO: 0.09 K/UL
BASOPHILS NFR BLD AUTO: 1.1 %
BILIRUB SERPL-MCNC: 0.6 MG/DL
BILIRUBIN URINE: NEGATIVE
BLOOD URINE: NEGATIVE
BUN SERPL-MCNC: 21 MG/DL
CALCIUM SERPL-MCNC: 9.9 MG/DL
CHLORIDE SERPL-SCNC: 104 MMOL/L
CHOLEST SERPL-MCNC: 187 MG/DL
CO2 SERPL-SCNC: 23 MMOL/L
COLOR: YELLOW
CREAT SERPL-MCNC: 1.15 MG/DL
EOSINOPHIL # BLD AUTO: 0.48 K/UL
EOSINOPHIL NFR BLD AUTO: 5.9 %
GLUCOSE QUALITATIVE U: NEGATIVE
GLUCOSE SERPL-MCNC: 91 MG/DL
HCT VFR BLD CALC: 47 %
HDLC SERPL-MCNC: 72 MG/DL
HGB BLD-MCNC: 14.9 G/DL
HYALINE CASTS: 1 /LPF
IMM GRANULOCYTES NFR BLD AUTO: 0.2 %
KETONES URINE: NEGATIVE
LDLC SERPL CALC-MCNC: 104 MG/DL
LDLC SERPL DIRECT ASSAY-MCNC: 111 MG/DL
LEUKOCYTE ESTERASE URINE: NEGATIVE
LYMPHOCYTES # BLD AUTO: 2.98 K/UL
LYMPHOCYTES NFR BLD AUTO: 36.8 %
MAN DIFF?: NORMAL
MCHC RBC-ENTMCNC: 29.4 PG
MCHC RBC-ENTMCNC: 31.7 GM/DL
MCV RBC AUTO: 92.7 FL
MICROSCOPIC-UA: NORMAL
MONOCYTES # BLD AUTO: 0.73 K/UL
MONOCYTES NFR BLD AUTO: 9 %
NEUTROPHILS # BLD AUTO: 3.79 K/UL
NEUTROPHILS NFR BLD AUTO: 47 %
NITRITE URINE: NEGATIVE
NONHDLC SERPL-MCNC: 115 MG/DL
PH URINE: 5.5
PLATELET # BLD AUTO: 307 K/UL
POTASSIUM SERPL-SCNC: 4.4 MMOL/L
PROT SERPL-MCNC: 7.3 G/DL
PROTEIN URINE: NORMAL
PSA SERPL-MCNC: <0.01 NG/ML
RBC # BLD: 5.07 M/UL
RBC # FLD: 12.9 %
RED BLOOD CELLS URINE: 2 /HPF
SODIUM SERPL-SCNC: 141 MMOL/L
SPECIFIC GRAVITY URINE: 1.03
SQUAMOUS EPITHELIAL CELLS: 0 /HPF
TRIGL SERPL-MCNC: 55 MG/DL
TSH SERPL-ACNC: 1.84 UIU/ML
UROBILINOGEN URINE: NORMAL
WBC # FLD AUTO: 8.09 K/UL
WHITE BLOOD CELLS URINE: 1 /HPF

## 2021-11-18 ENCOUNTER — TRANSCRIPTION ENCOUNTER (OUTPATIENT)
Age: 71
End: 2021-11-18

## 2021-11-22 ENCOUNTER — FORM ENCOUNTER (OUTPATIENT)
Age: 71
End: 2021-11-22

## 2021-11-23 ENCOUNTER — TRANSCRIPTION ENCOUNTER (OUTPATIENT)
Age: 71
End: 2021-11-23

## 2021-12-13 ENCOUNTER — RX RENEWAL (OUTPATIENT)
Age: 71
End: 2021-12-13

## 2022-01-05 ENCOUNTER — APPOINTMENT (OUTPATIENT)
Dept: NEUROLOGY | Facility: CLINIC | Age: 72
End: 2022-01-05
Payer: MEDICARE

## 2022-01-05 VITALS — BODY MASS INDEX: 27.64 KG/M2 | HEIGHT: 66 IN | WEIGHT: 172 LBS

## 2022-01-05 PROCEDURE — 99213 OFFICE O/P EST LOW 20 MIN: CPT | Mod: 95

## 2022-01-05 NOTE — PHYSICAL EXAM
[FreeTextEntry1] : Examination:\par Patient is well appearing\par Neurological Examination:\par Mental Status: Awake, alert. Oriented to person, place, time\par Language: No aphasia\par Cranial Nerves:\par EOMI, No facial weakness, tongue protrudes in the midline\par Motor Exam: No pronator drift. Fine motor movements intact in hands. Some slowness on barrel roll.\par Able to stand up from chair without difficulty\par Some decreased amplitude with figner tap.\par Sensory: intact on self exam\par Reflexes: Unable to examine\par Cerebellar: Finger to nose intact bilaterally\par \par \par

## 2022-01-05 NOTE — DISCUSSION/SUMMARY
[FreeTextEntry1] : Mr. Schulz is a 71 year old man with a right hand tremor.\par He was diagnosed with Parkinson's Disease in 2015.\par \par Parkinson's Disease\par -Symptoms have remained fairly stable overtime.\par -Continue rasagiline 1 mg/day\par -Continue Ropinirole ER 6 mg BID\par -He does not tolerate carbidopa/levodopa\par -Continue daily exercise\par -He is noticing some mild difficulty with swallowing but is able to compensate by keeping his throat moist.\par If symptoms progress will send for modified barium swallow.\par \par Snoring, poor sleep\par -Home sleep study not yet performed to r/o CARTER.\par \par Dream Enactment Behavior\par -Rare episodes of dream enactment behavior.\par -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 It is not feasible for him to have a polysomnogram at this time. \par -Can use melatonin.\par \par He would like to undergo left shoulder surgery.\par We discussed risks and benefits. Given underlying neurological condition he may be at increased risk for delirium in the post surgical period.\par However, this is a risk/benefit assessment. Since his shoulder problem is currently impacting his quality of life, the benefit of surgery seems to outweigh the risks.\par \par He should bring his own medications to the hospital in case they are not on formulary.\par \par follow-up six months, sooner if needed. \par \par

## 2022-01-05 NOTE — HISTORY OF PRESENT ILLNESS
[Home] : at home, [unfilled] , at the time of the visit. [Medical Office: ([facility])___] : at the medical office located in  [Verbal consent obtained from patient] : the patient, [unfilled] [FreeTextEntry1] : Initial Visit 10/25/21:\par Mr. Schulz is here today for neurology evaluation.\par He was previously seeing Dr. Andreas Benedict from 2015 to 2017 and then saw Dr. Willian Agarwal at [place].\par He was diagnosed with Parkinson's Disease in 2015.\par He has tremor in his right hand and has not had significant progression in symptoms.\par His rest tremor is still isolated to his right hand.\par He notes some mild issues with balance and goes to Pilates to help with balance.\par He denies having any falls.\par He remains very active.\par \par He has some mild difficulty swallowing, particularly if the food is dry.\par \par He states that sleep is terrible.\par He has shoulder issues affecting his sleep.\par His wife is an active sleeper.\par He does believe that he snores.\par He has had dream enactment once or twice a year - kicks while dreaming that he is playing soccer.\par \par He is under a lot of stress due to his wife's health issues.\par He feels that his mood is situational and describes himself as fatalistic.\par \par He notes some mild short term memory issues but remains independent in his ADLs and takes care of the household finances.\par \par He was started on carbidopa/levodopa by Dr. Agarwal but does not tolerate it. He states that he is less functional when taking this medication.\par \par Current PD meds:\par Rasagaline 1 mg/day\par Ropinirole 6 mg BID\par \par He denies having side effects with this medication.\par \par \par Interval History\par 1/5/21:\par Due to connection issues with [employer], Doximity was used.\par He would like to have shoulder surgery on the left side with Dr. Briscoe at [facility].\par He was told that he would need neurological clearance.\par \par He feels that symptoms are relatively stable.\par He does sometimes notice some increased amplitude and frequency of tremors.\par Balance is stable. He is doing Pilates and working out daily.\par He rarely has some difficulty swallowing dry food.\par He feels that memory is compatible with age.\par \par His shoulder pain affect his sleep.\par \par He has undergone anesthesia before without difficulty.\par \par \par \par \par \par

## 2022-01-05 NOTE — DATA REVIEWED
[de-identified] : MRI brain without contrast 11/27/15: Mild volume loss with a few foci of increased T2 and FLAIR signal in the white matter. These are nonspecific and likely minimal microvascular ischemic change. There is no restricted diffusion to suggest new ischemic change. \par There is no acute hemorrhage or midline shift.

## 2022-01-13 ENCOUNTER — TRANSCRIPTION ENCOUNTER (OUTPATIENT)
Age: 72
End: 2022-01-13

## 2022-01-26 ENCOUNTER — FORM ENCOUNTER (OUTPATIENT)
Age: 72
End: 2022-01-26

## 2022-02-09 ENCOUNTER — RX RENEWAL (OUTPATIENT)
Age: 72
End: 2022-02-09

## 2022-02-16 ENCOUNTER — NON-APPOINTMENT (OUTPATIENT)
Age: 72
End: 2022-02-16

## 2022-03-03 ENCOUNTER — APPOINTMENT (OUTPATIENT)
Dept: CARDIOLOGY | Facility: CLINIC | Age: 72
End: 2022-03-03
Payer: MEDICARE

## 2022-03-03 ENCOUNTER — NON-APPOINTMENT (OUTPATIENT)
Age: 72
End: 2022-03-03

## 2022-03-03 VITALS
BODY MASS INDEX: 28.61 KG/M2 | HEART RATE: 80 BPM | WEIGHT: 178 LBS | SYSTOLIC BLOOD PRESSURE: 147 MMHG | DIASTOLIC BLOOD PRESSURE: 81 MMHG | OXYGEN SATURATION: 99 % | HEIGHT: 66 IN

## 2022-03-03 PROCEDURE — 99214 OFFICE O/P EST MOD 30 MIN: CPT

## 2022-03-03 PROCEDURE — 93000 ELECTROCARDIOGRAM COMPLETE: CPT

## 2022-03-03 NOTE — HISTORY OF PRESENT ILLNESS
[FreeTextEntry1] : I saw Ercikson Schulz in the office today for a followup visit, in anticipation of left shoulder replacement surgery. He is a 72-year-old white male being treated for hypertension, hyperlipidemia, and elevated cardiac calcium score. He has a family history of ischemic heart disease with mother and dad having heart problems in their early 60s.\par \par The patient exercises regularly and has no chest pain or shortness of breath. There is a history of prostate cancer. Status post radical prostatectomy about 10 years ago and is considered to be cured. Patient also has a tremor of his right arm and has been diagnosed with early Parkinson's disease. He is taking Azilect..\par \par The patient had a cardiac calcium score performed 2/16. The total score was 573, , circumflex 165, and RCA 70.\par \par The patient had a regular stress test 6/15 that showed no ischemia to a workload of 10 Mets. Had a carotid Doppler 8/20 that showed mild nonobstructive plaque, without change compared to 2014.Repeat stress test 6/20 was normal but to a low workload of 6 minutes. Peak blood pressure was 220/82.At home his blood pressure was between 130 and 140/80.Echocardiogram 8/20 demonstrated an ejection fraction of 55-60% with mild MR.\par \par Blood work dated 4/21 demonstrates a total cholesterol of 157, triglycerides 37, HDL 74, and LDL 77... The patient has a lot of body aches. Intolerant to statin medication Does have spinal stenosis and a lot of arthritis.He off  Repatha. So far he is tolerating red yeast rice twice a day.  Repeat blood work 10/21 demonstrated a cholesterol 187, triglycerides 55, HDL 72, and .\par \par He underwent spinal surgery in the fall. He had a synovial cyst. This went well but he has been sedentary during the recovery period.\par \par He had a Holter monitor performed 3/19. Heart rate between 48 and 106 with an average rate of 71. 36 VPCs, 134 APCs, one couplet, and one triplet.\par \par He has noticed slight ankle edema after he exercises.  Also has been gaining weight.  These are side effects of his Parkinson's medication.  He exercise on a stationary bicycle for half an hour a day, and lifts weights and does calisthenics for half an hour 5 days a week.  He has no chest pain, shortness of breath, palpitation..\par .\par \par He is developing increasing shoulder pain and loss of function and is scheduled for shoulder replacement surgery at [facility] for special surgery 3/22/2022\par \par ECG demonstrates sinus rhythm and remains normal.\par

## 2022-03-03 NOTE — DISCUSSION/SUMMARY
[FreeTextEntry1] : Clinically the patient is doing well.  He exercises for an hour 5 days a week, has good functional status, without any chest pain, shortness of breath, or palpitation.  He had a normal stress test performed 8/22 workload of 6 METS.  He did have a hypertensive response to exercise.  At that time he had a normal echocardiogram and a carotid Doppler that showed mild nonobstructive plaque.\par \par Patient's cardiac status is stable and he represents a satisfactory candidate for the planned shoulder replacement surgery.  No special precautions other than routine hemodynamic monitoring should be required.  He will stop his aspirin 1 week before the surgery and this should be restarted as soon as you feel it is safe.  He will stay on his blood pressure medication.  I would appreciate a copy of his presurgical testing for my review.

## 2022-03-03 NOTE — PHYSICAL EXAM
[General Appearance - Well Developed] : well developed [Normal Appearance] : normal appearance [Well Groomed] : well groomed [No Deformities] : no deformities [General Appearance - Well Nourished] : well nourished [General Appearance - In No Acute Distress] : no acute distress [Normal Conjunctiva] : the conjunctiva exhibited no abnormalities [Normal Oral Mucosa] : normal oral mucosa [Normal Jugular Venous A Waves Present] : normal jugular venous A waves present [Normal Jugular Venous V Waves Present] : normal jugular venous V waves present [No Jugular Venous Alegria A Waves] : no jugular venous alegria A waves [Respiration, Rhythm And Depth] : normal respiratory rhythm and effort [Exaggerated Use Of Accessory Muscles For Inspiration] : no accessory muscle use [Auscultation Breath Sounds / Voice Sounds] : lungs were clear to auscultation bilaterally [Bowel Sounds] : normal bowel sounds [Abdomen Soft] : soft [Abdomen Tenderness] : non-tender [Abnormal Walk] : normal gait [Gait - Sufficient For Exercise Testing] : the gait was sufficient for exercise testing [Nail Clubbing] : no clubbing of the fingernails [Cyanosis, Localized] : no localized cyanosis [Skin Color & Pigmentation] : normal skin color and pigmentation [Skin Turgor] : normal skin turgor [] : no rash [Oriented To Time, Place, And Person] : oriented to person, place, and time [Impaired Insight] : insight and judgment were intact [No Anxiety] : not feeling anxious [5th Left ICS - MCL] : palpated at the 5th LICS in the midclavicular line [Normal] : normal [No Precordial Heave] : no precordial heave was noted [Normal Rate] : normal [Normal S1] : normal S1 [Normal S2] : normal S2 [No Murmur] : no murmurs heard [2+] : left 2+ [No Abnormalities] : the abdominal aorta was not enlarged and no bruit was heard [No Pitting Edema] : no pitting edema present [Apical Thrill] : no thrill palpable at the apex [S3] : no S3 [S4] : no S4 [Right Carotid Bruit] : no bruit heard over the right carotid [Left Carotid Bruit] : no bruit heard over the left carotid [Right Femoral Bruit] : no bruit heard over the right femoral artery [Left Femoral Bruit] : no bruit heard over the left femoral artery

## 2022-03-22 ENCOUNTER — FORM ENCOUNTER (OUTPATIENT)
Age: 72
End: 2022-03-22

## 2022-03-24 ENCOUNTER — NON-APPOINTMENT (OUTPATIENT)
Age: 72
End: 2022-03-24

## 2022-03-29 ENCOUNTER — APPOINTMENT (OUTPATIENT)
Dept: FAMILY MEDICINE | Facility: CLINIC | Age: 72
End: 2022-03-29
Payer: MEDICARE

## 2022-03-29 VITALS — TEMPERATURE: 98.5 F

## 2022-03-29 VITALS
BODY MASS INDEX: 28.61 KG/M2 | WEIGHT: 178 LBS | OXYGEN SATURATION: 96 % | TEMPERATURE: 97.3 F | DIASTOLIC BLOOD PRESSURE: 80 MMHG | SYSTOLIC BLOOD PRESSURE: 142 MMHG | HEART RATE: 84 BPM | HEIGHT: 66 IN

## 2022-03-29 DIAGNOSIS — R50.9 FEVER, UNSPECIFIED: ICD-10-CM

## 2022-03-29 DIAGNOSIS — R50.82 POSTPROCEDURAL FEVER: ICD-10-CM

## 2022-03-29 PROCEDURE — 36415 COLL VENOUS BLD VENIPUNCTURE: CPT

## 2022-03-29 PROCEDURE — 99213 OFFICE O/P EST LOW 20 MIN: CPT | Mod: 25

## 2022-03-29 NOTE — HISTORY OF PRESENT ILLNESS
[FreeTextEntry8] : Pt is a 71yo male presenting to the office for evaluation of fever post-op.\par Pt is 7 days post-op, left shoulder replacement 3/22/2022 with Dr. Briscoe ([facility]).\par Has had fever very evening starting 3/24/22, 100.7-100.9F temporally.\par Has been taking Tylenol for fever.\par Pt reports PND with cough for the past few days.  Non-productive.  Pt was intubated for procedure.\par Denies dysuria.\par Denies swelling, redness, drainage, or bleeding.\par Pain level low on left shoulder, not taking medication for pain.\par Pt was given dose of IV antibiotics prior to leaving hospital but was not discharged on antibiotics.\par \par Pt informed Dr. Briscoe's office about fever, told to monitor temperature and re-evaluate if >101F.\par Has virtual follow up with Dr. Briscoe's office next week.

## 2022-03-29 NOTE — PLAN
[FreeTextEntry1] : See assessment.\par Labs.\par Very strict ED precautions provided.\par Case discussed with and pt evaluated by Dr. DAQUAN Nolen.

## 2022-03-29 NOTE — REVIEW OF SYSTEMS
[Fever] : fever [Joint Pain] : joint pain [Negative] : Psychiatric [FreeTextEntry9] : left shoulder s/p replacement

## 2022-03-29 NOTE — ASSESSMENT
[FreeTextEntry1] : Pt is a 71yo male presenting to the office complaining of post-op fever, s/p left total shoulder replacement 1 week ago today.  Has had daily fevers occurring only in the evening, up to 100.9F.  Does admit to mild PND/cough otherwise feels well.  Pain level very well controlled, not taking much medication for pain.\par \par Postop Fever\par - Pt well appearing, non-toxic.  Joint intact, no significant redness, no drainage from the incision site.\par - Lungs CTA bilaterally in all lung fields.\par - Vitals stable in office.\par - Labs drawn in office.\par - Urine culture sent to lab.\par - Further recommendations pending blood work results.\par - Pt provided with very strict ED precautions, including worsening fever, more persistent fever, confusion, headaches, dizziness, loss of consciousness, worsening swelling/pain/redness of the shoulder joint, drainage from surgical site,

## 2022-03-29 NOTE — PHYSICAL EXAM
[No Edema] : there was no peripheral edema [No Rash] : no rash [Coordination Grossly Intact] : coordination grossly intact [No Focal Deficits] : no focal deficits [Normal Gait] : normal gait [Normal Affect] : the affect was normal [Normal Insight/Judgement] : insight and judgment were intact [Normal] : no posterior cervical lymphadenopathy and no anterior cervical lymphadenopathy [de-identified] : non-toxic. [de-identified] : post-op left shoulder with dressing, C/D/I.  minimal swelling.  no redness.  not warm to touch.  strength 5/5 hand .  radial pulse 2+ and strong.

## 2022-03-30 LAB
ALBUMIN SERPL ELPH-MCNC: 4.1 G/DL
ALP BLD-CCNC: 56 U/L
ALT SERPL-CCNC: 28 U/L
ANION GAP SERPL CALC-SCNC: 12 MMOL/L
AST SERPL-CCNC: 27 U/L
BASOPHILS # BLD AUTO: 0.06 K/UL
BASOPHILS NFR BLD AUTO: 0.7 %
BILIRUB SERPL-MCNC: 0.4 MG/DL
BUN SERPL-MCNC: 19 MG/DL
CALCIUM SERPL-MCNC: 9.2 MG/DL
CHLORIDE SERPL-SCNC: 109 MMOL/L
CO2 SERPL-SCNC: 24 MMOL/L
CREAT SERPL-MCNC: 1.02 MG/DL
EGFR: 78 ML/MIN/1.73M2
EOSINOPHIL # BLD AUTO: 0.22 K/UL
EOSINOPHIL NFR BLD AUTO: 2.5 %
GLUCOSE SERPL-MCNC: 110 MG/DL
HCT VFR BLD CALC: 39.2 %
HGB BLD-MCNC: 12.4 G/DL
IMM GRANULOCYTES NFR BLD AUTO: 0.6 %
LYMPHOCYTES # BLD AUTO: 2.04 K/UL
LYMPHOCYTES NFR BLD AUTO: 22.9 %
MAN DIFF?: NORMAL
MCHC RBC-ENTMCNC: 29 PG
MCHC RBC-ENTMCNC: 31.6 GM/DL
MCV RBC AUTO: 91.8 FL
MONOCYTES # BLD AUTO: 0.93 K/UL
MONOCYTES NFR BLD AUTO: 10.5 %
NEUTROPHILS # BLD AUTO: 5.59 K/UL
NEUTROPHILS NFR BLD AUTO: 62.8 %
PLATELET # BLD AUTO: 319 K/UL
POTASSIUM SERPL-SCNC: 4.2 MMOL/L
PROT SERPL-MCNC: 6.7 G/DL
RBC # BLD: 4.27 M/UL
RBC # FLD: 13 %
SODIUM SERPL-SCNC: 144 MMOL/L
WBC # FLD AUTO: 8.89 K/UL

## 2022-03-31 ENCOUNTER — NON-APPOINTMENT (OUTPATIENT)
Age: 72
End: 2022-03-31

## 2022-03-31 LAB — BACTERIA UR CULT: NORMAL

## 2022-04-28 ENCOUNTER — APPOINTMENT (OUTPATIENT)
Dept: NEUROLOGY | Facility: CLINIC | Age: 72
End: 2022-04-28
Payer: MEDICARE

## 2022-04-28 VITALS
DIASTOLIC BLOOD PRESSURE: 83 MMHG | SYSTOLIC BLOOD PRESSURE: 164 MMHG | WEIGHT: 170 LBS | TEMPERATURE: 97.8 F | BODY MASS INDEX: 27.32 KG/M2 | HEART RATE: 67 BPM | HEIGHT: 66 IN

## 2022-04-28 PROCEDURE — 99214 OFFICE O/P EST MOD 30 MIN: CPT

## 2022-04-28 NOTE — HISTORY OF PRESENT ILLNESS
[FreeTextEntry1] : Initial Visit 10/25/21:\par Mr. Schulz is here today for neurology evaluation.\par He was previously seeing Dr. Andreas Benedict from 2015 to 2017 and then saw Dr. Willian Agarwal at [facility].\par He was diagnosed with Parkinson's Disease in 2015.\par He has tremor in his right hand and has not had significant progression in symptoms.\par His rest tremor is still isolated to his right hand.\par He notes some mild issues with balance and goes to Pilates to help with balance.\par He denies having any falls.\par He remains very active.\par \par He has some mild difficulty swallowing, particularly if the food is dry.\par \par He states that sleep is terrible.\par He has shoulder issues affecting his sleep.\par His wife is an active sleeper.\par He does believe that he snores.\par He has had dream enactment once or twice a year - kicks while dreaming that he is playing soccer.\par \par He is under a lot of stress due to his wife's health issues.\par He feels that his mood is situational and describes himself as fatalistic.\par \par He notes some mild short term memory issues but remains independent in his ADLs and takes care of the household finances.\par \par He was started on carbidopa/levodopa by Dr. Agarwal but does not tolerate it. He states that he is less functional when taking this medication.\par \par Current PD meds:\par Rasagaline 1 mg/day\par Ropinirole 6 mg BID\par \par He denies having side effects with this medication.\par \par \par Interval History\par 1/5/21:\par Due to connection issues with [employer], Doximity was used.\par He would like to have shoulder surgery on the left side with Dr. Briscoe at [facility].\par He was told that he would need neurological clearance.\par \par He feels that symptoms are relatively stable.\par He does sometimes notice some increased amplitude and frequency of tremors.\par Balance is stable. He is doing Pilates and working out daily.\par He rarely has some difficulty swallowing dry food.\par He feels that memory is compatible with age.\par \par His shoulder pain affect his sleep.\par \par He has undergone anesthesia before without difficulty.\par \par 4/28/22:\par He did not end up needing to change his medication. Insurance did cover the ropinirole ER.\par He had left shoulder replacement about five weeks ago. He recently started PT.\par He believes that his PD symptoms are somewhat exacerbated since surgery. He reports increased amplitude of his right hand tremor and notices it more.\par \par He reports that he did not have covid but reports brain fog. He has had multiple negative covid tests.\par He finds himself falling asleep at the computer. He is not taking pain medications for his shoulder. \par He was noticing brain fog before the surgery.\par He is not sleeping well but it is improved since before surgery and since being in an immobilizer.\par \par He denies worsening of balance.\par \par He has dreams of playing soccer and his wife reports kicking in his sleep. \par \par He has noticed some mild difficulty with swallowing if eating dry food. \par \par He has also noticed muscle cramps at night.\par \par \par \par \par \par

## 2022-04-28 NOTE — DISCUSSION/SUMMARY
[FreeTextEntry1] : Mr. Schulz is a 72 year old man with a right hand tremor.\par He was diagnosed with Parkinson's Disease in 2015.\par \par Parkinson's Disease\par -Despite some mild worsening of symptoms he would like to avoid increasing medications at this time.\par -Continue rasagiline 1 mg/day\par -Continue Ropinirole ER 6 mg BID\par -He does not tolerate carbidopa/levodopa\par -Continue daily exercise\par -He is noticing some mild difficulty with swallowing but is able to compensate by keeping his throat moist.\par If symptoms progress will send for modified barium swallow.\par -Increase exercise as allowed.\par \par Snoring, poor sleep\par -Home sleep study  r/o CARTER.\par \par Dream Enactment Behavior\par -Rare episodes of dream enactment behavior.\par -Advised use of melatonin 3 mg at bedtime. Can increase b increments of 3 mg to 9 mg.\par \par Brain Fog\par -May be secondary to poor sleep (inadequate amount, possible CARTER).\par -Will get sleep study, increase sleep time.\par \par follow-up 4-6 months, sooner if needed. \par

## 2022-04-28 NOTE — PHYSICAL EXAM
[FreeTextEntry1] : Examination:\par Constitutional: normal, no apparent distress\par Eyes: normal conjunctiva b/l, no ptosis, visual fields full\par Respiratory: no respiratory distress, normal effort, normal auscultation\par Cardiovascular: normal rate, rhythm, no murmurs\par Neck: supple, no masses\par Vascular: carotids normal\par Skin: normal color, no rashes\par Psych: normal mood, affect\par \par Neurological:\par Memory: normal memory, oriented to person, place, time\par Language intact/no aphasia\par Cranial Nerves: II-XII intact, Pupils equally round and reactive to light, ocular muscles/movements intact, no ptosis, no facial weakness, tongue protrudes normally in the midline, \par Motor: normal tone, no pronator drift, full strength in all four extremities in the proximal and distal muscle groups\par Coordination: Decreased amplitude and speed of rapid alternating movements on right. + rest tremor on right. No cogwheel rigidity. Finger to nose intact bilaterally\par Sensory: Decreased sensation in left forearm\par DTRs: symmetric, 1+ in b/l triceps, 1+ in b/l biceps, 1+ in b/l brachioradialis, 1+ in bilateral patellars\par Gait: narrow based, steady, good stride length and arm swing, some right hand tremor seen while walking\par

## 2022-04-28 NOTE — DATA REVIEWED
[de-identified] : MRI brain without contrast 11/27/15: Mild volume loss with a few foci of increased T2 and FLAIR signal in the white matter. These are nonspecific and likely minimal microvascular ischemic change. There is no restricted diffusion to suggest new ischemic change. \par There is no acute hemorrhage or midline shift.

## 2022-05-09 ENCOUNTER — NON-APPOINTMENT (OUTPATIENT)
Age: 72
End: 2022-05-09

## 2022-05-10 ENCOUNTER — NON-APPOINTMENT (OUTPATIENT)
Age: 72
End: 2022-05-10

## 2022-05-10 ENCOUNTER — TRANSCRIPTION ENCOUNTER (OUTPATIENT)
Age: 72
End: 2022-05-10

## 2022-05-13 ENCOUNTER — APPOINTMENT (OUTPATIENT)
Dept: DISASTER EMERGENCY | Facility: HOSPITAL | Age: 72
End: 2022-05-13

## 2022-05-13 ENCOUNTER — OUTPATIENT (OUTPATIENT)
Dept: INPATIENT UNIT | Facility: HOSPITAL | Age: 72
LOS: 1 days | End: 2022-05-13
Payer: MEDICARE

## 2022-05-13 VITALS
HEART RATE: 86 BPM | DIASTOLIC BLOOD PRESSURE: 89 MMHG | TEMPERATURE: 99 F | RESPIRATION RATE: 17 BRPM | OXYGEN SATURATION: 99 % | SYSTOLIC BLOOD PRESSURE: 169 MMHG

## 2022-05-13 VITALS
SYSTOLIC BLOOD PRESSURE: 169 MMHG | TEMPERATURE: 99 F | HEART RATE: 86 BPM | OXYGEN SATURATION: 99 % | DIASTOLIC BLOOD PRESSURE: 89 MMHG | RESPIRATION RATE: 18 BRPM

## 2022-05-13 DIAGNOSIS — Z90.79 ACQUIRED ABSENCE OF OTHER GENITAL ORGAN(S): Chronic | ICD-10-CM

## 2022-05-13 DIAGNOSIS — U07.1 COVID-19: ICD-10-CM

## 2022-05-13 DIAGNOSIS — M71.30 OTHER BURSAL CYST, UNSPECIFIED SITE: Chronic | ICD-10-CM

## 2022-05-13 DIAGNOSIS — S89.90XA UNSPECIFIED INJURY OF UNSPECIFIED LOWER LEG, INITIAL ENCOUNTER: Chronic | ICD-10-CM

## 2022-05-13 PROCEDURE — M0222: CPT

## 2022-05-13 RX ORDER — BEBTELOVIMAB 87.5 MG/ML
175 INJECTION, SOLUTION INTRAVENOUS ONCE
Refills: 0 | Status: COMPLETED | OUTPATIENT
Start: 2022-05-13 | End: 2022-05-13

## 2022-05-13 RX ADMIN — BEBTELOVIMAB 175 MILLIGRAM(S): 87.5 INJECTION, SOLUTION INTRAVENOUS at 09:49

## 2022-05-13 NOTE — MONOCLONAL ANTIBODY INFUSION - ASSESSMENT AND PLAN
CC: Monoclonal Antibody Infusion/COVID 19 Positive  72y Male with Parkinson, HTN, and recent dx of COVID 19+ who presents today for elective Bebtelovimab. Patient has been screened and was deemed to be a candidate.    Symptoms/ Criteria  Date of Symptom Onset: 5/10/22  Symptoms: sore throat, cough, fever, malaise   Date of Positive COVID PCR: 5/10/22  Risk Profile includes:   age    Vaccination Status: Pfizer booster 10/2021     PMHx:  Infection due to severe acute respiratory syndrome coronavirus 2 (SARS-CoV-2)    ASSESSMENT:  Pt is COVID positive and symptomatic who was referred for Bebtelovimab monoclonal antibody treatment.    PLAN:  - MAB treatment explained to patient. I have reviewed the Bebtelovimab Emergency Use Authorization (EUA).   - Consent for MAB obtained.   - Risk & benefits discussed. Patient verbalized understanding of plan and agrees to treatment. All questions answered.  - 175mg of Bebtelovimab administered as a single intravenous injection over at least 30 seconds.   - Observe patient for one hour post medication administration and then if stable, discharge home with outpatient follow up as planned by PCP.    POST ASSESSMENT:   Patient completed MAB, and monitored x 1 hour post-infusion with no adverse reactions noted, remained hemodynamically stable.  - Patient tolerated injection well; denies complaints of chest pain/SOB/dizziness/palpitations.   - VSS for discharge home.  - D/C instructions given/ fact sheet included.  - Patient was instructed to self-isolate and use infection control measures (e.g wear mask, isolate, social distance, avoid sharing personal items, clean and disinfect "high touch" surfaces, and frequent handwashing according to the CDC guidelines.   - The patient was informed on what symptoms to be aware of for the next couple of days, and if there are any issues to call the 24/7 clinical call center. Patient was instructed to follow up with primary care provider as needed.    DISCHARGE at 10:50AM.

## 2022-05-13 NOTE — MONOCLONAL ANTIBODY INFUSION - HOME MEDICATIONS
vardenafil 20 mg oral tablet , 1 tab(s) orally once, As Needed  aspirin 81 mg oral tablet , 1 tab(s) orally once a day  Praluent Pen 75 mg/mL subcutaneous solution , 1 milliliter(s) subcutaneous every other week  *Next dose due 9/29*  rasagiline 1 mg oral tablet , 1 tab(s) orally once a day in the morning  omeprazole 20 mg oral delayed release capsule , 1 cap(s) orally once a day  rOPINIRole 2 mg oral tablet, extended release , 3 tab(s) orally 2 times a day

## 2022-05-13 NOTE — MONOCLONAL ANTIBODY INFUSION - EXAM
Exam/findings:  T(C): 37.3 (05-13-22 @ 09:45), Max: 37.3 (05-13-22 @ 09:45)  HR: 86 (05-13-22 @ 09:45) (86 - 86)  BP: 169/89 (05-13-22 @ 09:45) (169/89 - 169/89)  RR: 17 (05-13-22 @ 09:45) (17 - 17)  SpO2: 99% (05-13-22 @ 09:45) (99% - 99%)    PE:   Appearance: NAD	  HEENT:  NC/AT  Cardiovascular:  No edema  Respiratory: no use of accessory muscles  Gastrointestinal:  non-distended   Skin: warm and dry  Neurologic: Non-focal  Extremities: Normal range of motion

## 2022-05-22 ENCOUNTER — FORM ENCOUNTER (OUTPATIENT)
Age: 72
End: 2022-05-22

## 2022-06-06 ENCOUNTER — APPOINTMENT (OUTPATIENT)
Dept: NEUROLOGY | Facility: CLINIC | Age: 72
End: 2022-06-06
Payer: MEDICARE

## 2022-06-06 PROCEDURE — 95806 SLEEP STUDY UNATT&RESP EFFT: CPT

## 2022-06-07 ENCOUNTER — APPOINTMENT (OUTPATIENT)
Dept: CARDIOLOGY | Facility: CLINIC | Age: 72
End: 2022-06-07
Payer: MEDICARE

## 2022-06-07 VITALS
HEART RATE: 83 BPM | BODY MASS INDEX: 27.32 KG/M2 | HEIGHT: 66 IN | OXYGEN SATURATION: 98 % | WEIGHT: 170 LBS | SYSTOLIC BLOOD PRESSURE: 156 MMHG | DIASTOLIC BLOOD PRESSURE: 88 MMHG

## 2022-06-07 PROCEDURE — 99214 OFFICE O/P EST MOD 30 MIN: CPT

## 2022-06-07 RX ORDER — CHLORHEXIDINE GLUCONATE 4 %
600 LIQUID (ML) TOPICAL TWICE DAILY
Refills: 0 | Status: ACTIVE | COMMUNITY

## 2022-06-07 NOTE — HISTORY OF PRESENT ILLNESS
[FreeTextEntry1] : I saw Erickson Schulz in the office today for a followup visit. He is a 72-year-old white male being treated for hypertension, hyperlipidemia, and elevated cardiac calcium score. He has a family history of ischemic heart disease with mother and dad having heart problems in their early 60s.\par \par The patient exercises regularly and has no chest pain or shortness of breath. There is a history of prostate cancer. Status post radical prostatectomy about 10 years ago and is considered to be cured. Patient also has a tremor of his right arm and has been diagnosed with early Parkinson's disease. He is taking Azilect..\par \par The patient had a cardiac calcium score performed 2/16. The total score was 573, , circumflex 165, and RCA 70.\par \par The patient had a regular stress test 6/15 that showed no ischemia to a workload of 10 Mets. Had a carotid Doppler 8/20 that showed mild nonobstructive plaque, without change compared to 2014.Repeat stress test 6/20 was normal but to a low workload of 6 mets. Peak blood pressure was 220/82.At home his blood pressure was between 130 and 140/80.Echocardiogram 8/20 demonstrated an ejection fraction of 55-60% with mild MR.\par \par Blood work dated 4/21 demonstrates a total cholesterol of 157, triglycerides 37, HDL 74, and LDL 77... The patient has a lot of body aches. Intolerant to statin medication Does have spinal stenosis and a lot of arthritis.He off  Repatha. So far he is tolerating red yeast rice twice a day.  Repeat blood work 10/21 demonstrated a cholesterol 187, triglycerides 55, HDL 72, and .  He had a normal CBC and CMP 3/22.\par \par He underwent spinal surgery in the fall. He had a synovial cyst. This went well but he has been sedentary during the recovery period.\par \par He had a Holter monitor performed 3/19. Heart rate between 48 and 106 with an average rate of 71. 36 VPCs, 134 APCs, one couplet, and one triplet.\par \par He has noticed slight ankle edema after he exercises.  Also has been gaining weight.  These are side effects of his Parkinson's medication.  Had shoulder surgery about 3 months ago and is in physical therapy.  Since the surgery has been more sedentary and noticed increase in his ankle edema.  Not been monitoring his blood pressure.

## 2022-06-07 NOTE — DISCUSSION/SUMMARY
[FreeTextEntry1] : Clinically the patient is doing well.  He has no chest pain, shortness of breath, palpitation.  Blood pressure is mildly elevated and he is developing increasing ankle edema as result of venous insufficiency.  We will try him on hydrochlorothiazide 12.5 mg once a day both for his blood pressure and his leg edema.  We did discuss the treatment of leg edema including low-salt diet, leg elevation, walking, and support socks.\par \par He will call me let me know how he is doing.  Once his blood pressure is controlled and he is feeling better, we may repeat his stress test.  Stress test 2 years ago was stopped prematurely because of the hypertensive response to exercise.  He did achieve age-predicted max without any ST or T wave changes.  He also will try increasing his red yeast rice to 1200 mg twice a day.  If he tolerates this he will repeat his blood work in approximately 3 months.\par \par Pain the above would see the patient again in about 4 months.

## 2022-06-09 ENCOUNTER — TRANSCRIPTION ENCOUNTER (OUTPATIENT)
Age: 72
End: 2022-06-09

## 2022-07-25 ENCOUNTER — RX RENEWAL (OUTPATIENT)
Age: 72
End: 2022-07-25

## 2022-08-01 ENCOUNTER — RX RENEWAL (OUTPATIENT)
Age: 72
End: 2022-08-01

## 2022-08-15 ENCOUNTER — APPOINTMENT (OUTPATIENT)
Dept: FAMILY MEDICINE | Facility: CLINIC | Age: 72
End: 2022-08-15

## 2022-08-31 ENCOUNTER — APPOINTMENT (OUTPATIENT)
Dept: FAMILY MEDICINE | Facility: CLINIC | Age: 72
End: 2022-08-31

## 2022-08-31 VITALS
WEIGHT: 165 LBS | HEIGHT: 66 IN | HEART RATE: 67 BPM | DIASTOLIC BLOOD PRESSURE: 64 MMHG | SYSTOLIC BLOOD PRESSURE: 122 MMHG | BODY MASS INDEX: 26.52 KG/M2 | TEMPERATURE: 97.3 F | OXYGEN SATURATION: 98 %

## 2022-08-31 PROCEDURE — G0439: CPT

## 2022-08-31 PROCEDURE — 90686 IIV4 VACC NO PRSV 0.5 ML IM: CPT

## 2022-08-31 PROCEDURE — 36415 COLL VENOUS BLD VENIPUNCTURE: CPT

## 2022-08-31 PROCEDURE — G0008: CPT

## 2022-08-31 NOTE — HISTORY OF PRESENT ILLNESS
[FreeTextEntry1] : МАРИНА MELVIN is a 72 year old male here for a physical exam.\par  [de-identified] : His last physical exam was last year\par \par Vaccines:\par Tetanus is NOT up to date; last 2012\par Pneumococcal vaccination is up to date\par Shingrix is NOT up to date; Zostavax 2012\par COVID vaccine is up to date\par \par His last dentist visit was less than one year ago\par His last eye doctor appointment was less than one year ago\par His last dermatologist visit was less than one year ago\par \par Colon cancer screening is up to date; colonoscopy 5/23/2019\par \par His diet is healthy overall\par Exercise: calisthenics\par

## 2022-08-31 NOTE — ASSESSMENT
[FreeTextEntry1] : МАРИНА MELVIN is a 72 year old male here for a physical exam.\par \par Patient has a history of carotid atherosclerosis, arthritis, hypercholesterolemia, hypertension, prostate cancer, and Parkinson's disease. He had a left shoulder replacement about 6 months ago and feels that he is recovering well from this.\par \par He sees a cardiologist regularly and does not need an EKG today. He previously saw Dr. Flroes but he retired this year so he is looking for a new cardiologist.\par

## 2022-08-31 NOTE — PLAN
[FreeTextEntry1] : Continue all medications as prescribed. Check labs as above. He has been having leg cramps since starting HCTZ for his blood pressure so his electrolytes may be affected by this. Will adjust any medications based upon lab results.\par \par Reviewed age-appropriate preventive screening tests with patient. He is due for Tdap and Shingrix and should get these both at the pharmacy where they will be covered by his insurance.\par \par Discussed clean eating (e.g. Mediterranean style diet) and recommendations for regular exercise/staying as physically active as possible.\par \par Reviewed importance of good self care (e.g. meditation, yoga, adequate rest, regular exercise, magnesium, clean eating, etc.).\par \par Follow up for next physical in one year.\par

## 2022-08-31 NOTE — REVIEW OF SYSTEMS
[Vision Problems] : vision problems [Lower Ext Edema] : lower extremity edema [Negative] : Heme/Lymph [FreeTextEntry9] : improved shoulder pain s/p surgery [de-identified] : tremor due to Parkinson's, neuropathy of left leg, nocturnal leg cramps and spasms

## 2022-08-31 NOTE — HEALTH RISK ASSESSMENT
[No falls in past year] : Patient reported no falls in the past year [0] : 2) Feeling down, depressed, or hopeless: Not at all (0) [PHQ-2 Negative - No further assessment needed] : PHQ-2 Negative - No further assessment needed [XDH2Ptrsf] : 0

## 2022-09-02 ENCOUNTER — TRANSCRIPTION ENCOUNTER (OUTPATIENT)
Age: 72
End: 2022-09-02

## 2022-09-02 LAB
ALBUMIN SERPL ELPH-MCNC: 4.8 G/DL
ALP BLD-CCNC: 62 U/L
ALT SERPL-CCNC: 16 U/L
ANION GAP SERPL CALC-SCNC: 13 MMOL/L
AST SERPL-CCNC: 21 U/L
BASOPHILS # BLD AUTO: 0.07 K/UL
BASOPHILS NFR BLD AUTO: 1 %
BILIRUB SERPL-MCNC: 0.8 MG/DL
BUN SERPL-MCNC: 23 MG/DL
CALCIUM SERPL-MCNC: 9.5 MG/DL
CHLORIDE SERPL-SCNC: 102 MMOL/L
CHOLEST SERPL-MCNC: 168 MG/DL
CO2 SERPL-SCNC: 27 MMOL/L
CREAT SERPL-MCNC: 1.16 MG/DL
EGFR: 67 ML/MIN/1.73M2
EOSINOPHIL # BLD AUTO: 0.78 K/UL
EOSINOPHIL NFR BLD AUTO: 10.9 %
GLUCOSE SERPL-MCNC: 92 MG/DL
HCT VFR BLD CALC: 47.1 %
HDLC SERPL-MCNC: 66 MG/DL
HGB BLD-MCNC: 14.5 G/DL
IMM GRANULOCYTES NFR BLD AUTO: 0.3 %
LDLC SERPL CALC-MCNC: 94 MG/DL
LYMPHOCYTES # BLD AUTO: 2.32 K/UL
LYMPHOCYTES NFR BLD AUTO: 32.4 %
MAGNESIUM SERPL-MCNC: 2.2 MG/DL
MAN DIFF?: NORMAL
MCHC RBC-ENTMCNC: 28.3 PG
MCHC RBC-ENTMCNC: 30.8 GM/DL
MCV RBC AUTO: 92 FL
MONOCYTES # BLD AUTO: 0.59 K/UL
MONOCYTES NFR BLD AUTO: 8.3 %
NEUTROPHILS # BLD AUTO: 3.37 K/UL
NEUTROPHILS NFR BLD AUTO: 47.1 %
NONHDLC SERPL-MCNC: 103 MG/DL
PLATELET # BLD AUTO: 272 K/UL
POTASSIUM SERPL-SCNC: 3.6 MMOL/L
PROT SERPL-MCNC: 7.3 G/DL
PSA SERPL-MCNC: <0.01 NG/ML
RBC # BLD: 5.12 M/UL
RBC # FLD: 14.5 %
SODIUM SERPL-SCNC: 142 MMOL/L
TRIGL SERPL-MCNC: 45 MG/DL
WBC # FLD AUTO: 7.15 K/UL

## 2022-09-08 ENCOUNTER — FORM ENCOUNTER (OUTPATIENT)
Age: 72
End: 2022-09-08

## 2022-10-05 ENCOUNTER — NON-APPOINTMENT (OUTPATIENT)
Age: 72
End: 2022-10-05

## 2022-10-06 ENCOUNTER — NON-APPOINTMENT (OUTPATIENT)
Age: 72
End: 2022-10-06

## 2022-10-06 ENCOUNTER — APPOINTMENT (OUTPATIENT)
Dept: CARDIOLOGY | Facility: CLINIC | Age: 72
End: 2022-10-06

## 2022-10-06 VITALS
HEART RATE: 91 BPM | HEIGHT: 66 IN | WEIGHT: 171 LBS | SYSTOLIC BLOOD PRESSURE: 136 MMHG | DIASTOLIC BLOOD PRESSURE: 72 MMHG | OXYGEN SATURATION: 97 % | BODY MASS INDEX: 27.48 KG/M2

## 2022-10-06 PROCEDURE — 99204 OFFICE O/P NEW MOD 45 MIN: CPT | Mod: 25

## 2022-10-06 PROCEDURE — 93000 ELECTROCARDIOGRAM COMPLETE: CPT

## 2022-10-06 RX ORDER — SENNOSIDES 50; 8.6 MG/1; MG/1
200 TABLET ORAL DAILY
Refills: 0 | Status: ACTIVE | COMMUNITY
Start: 2021-06-09

## 2022-10-06 RX ORDER — CHOLECALCIFEROL (VITAMIN D3) 50 MCG
50 MCG TABLET ORAL DAILY
Refills: 0 | Status: ACTIVE | COMMUNITY
Start: 2021-06-09

## 2022-10-06 NOTE — DISCUSSION/SUMMARY
[Hyperlipidemia] : hyperlipidemia [Hypertension] : hypertension [Patient] : the patient [de-identified] : s [FreeTextEntry1] : Pt will continue current medications. Various options for lipid lowering were discussed. Discussion of calcium score was entertained. Pt will be followed in 4 months.  [EKG obtained to assist in diagnosis and management of assessed problem(s)] : EKG obtained to assist in diagnosis and management of assessed problem(s)

## 2022-10-06 NOTE — HISTORY OF PRESENT ILLNESS
[FreeTextEntry1] : 73 yo male presents for initial evaluation for cardiac care. Pt has a history of HLD, HTN, PD. H/O prostatectomy. Pt denies chest pain. He reports mild dyspnea with exertion. Pt had recent left shoulder surgery is recovering well. Medications were reviewed. Previous testing and visits were reviewed.

## 2022-10-27 ENCOUNTER — APPOINTMENT (OUTPATIENT)
Dept: NEUROLOGY | Facility: CLINIC | Age: 72
End: 2022-10-27

## 2022-10-27 VITALS
HEIGHT: 66 IN | TEMPERATURE: 97.8 F | WEIGHT: 168 LBS | BODY MASS INDEX: 27 KG/M2 | DIASTOLIC BLOOD PRESSURE: 72 MMHG | SYSTOLIC BLOOD PRESSURE: 126 MMHG | HEART RATE: 66 BPM

## 2022-10-27 DIAGNOSIS — R06.83 SNORING: ICD-10-CM

## 2022-10-27 PROCEDURE — 99213 OFFICE O/P EST LOW 20 MIN: CPT

## 2022-10-27 NOTE — DISCUSSION/SUMMARY
[FreeTextEntry1] : Mr. Schulz is a 72 year old man with a right hand tremor.\par He was diagnosed with Parkinson's Disease in 2015.\par \par Parkinson's Disease\par -Despite some mild worsening of symptoms he would like to avoid increasing medications at this time.\par -Continue rasagiline 1 mg/day\par -Continue Ropinirole ER 6 mg BID. Advised that we can add an immediate release dose mid-day if needed.\par -He does not tolerate carbidopa/levodopa\par -Continue daily exercise\par -Continue strategies to minimize swallowing difficulties.\par \par Snoring, poor sleep\par -Home sleep study showed mild CARTER, not significant when non-supine.\par He prefers to sleep on his side and will continue with positional therapy for now.\par -Increase sleep duration at night. Cat naps if needed.\par \par Leg Cramps\par -Increase hydration during the day.\par -Can try to increase magnesium to 400-500 mg/day. Advised that this may cause diarrhea.\par \par Dream Enactment Behavior\par -Rare episodes of dream enactment behavior.\par -not currently taking melatonin. This can be added if the behavior recurs.\par \par \par follow-up 6 months, sooner if needed. \par

## 2022-10-27 NOTE — PHYSICAL EXAM
[FreeTextEntry1] : Examination:\par Constitutional: normal, no apparent distress\par Eyes: normal conjunctiva b/l, no ptosis, visual fields full\par Respiratory: no respiratory distress, normal effort, normal auscultation\par Cardiovascular: normal rate, rhythm, no murmurs\par Neck: supple, no masses\par Vascular: carotids normal\par Skin: normal color, no rashes\par Psych: normal mood, affect\par \par Neurological:\par Memory: normal memory, oriented to person, place, time\par Language intact/no aphasia\par Cranial Nerves: II-XII intact, Pupils equally round and reactive to light, ocular muscles/movements intact, no ptosis, no facial weakness, tongue protrudes normally in the midline, slight hypophonia\par Motor: normal tone, no pronator drift, full strength in all four extremities in the proximal and distal muscle groups\par Coordination: Decreased amplitude and speed of rapid alternating movements on right. + rest tremor on right. No cogwheel rigidity. Finger to nose intact bilaterally\par Sensory: Decreased sensation in left forearm\par DTRs: symmetric, 1+ in b/l triceps, 1+ in b/l biceps, 1+ in b/l brachioradialis, 1+ in bilateral patellars\par Gait: narrow based, steady, good stride length and arm swing, some right hand tremor seen while walking\par . \par

## 2022-10-27 NOTE — HISTORY OF PRESENT ILLNESS
[FreeTextEntry1] : Initial Visit 10/25/21:\par Mr. Schulz is here today for neurology evaluation.\par He was previously seeing Dr. Andreas Benedict from 2015 to 2017 and then saw Dr. Willian Agarwal at [place].\par He was diagnosed with Parkinson's Disease in 2015.\par He has tremor in his right hand and has not had significant progression in symptoms.\par His rest tremor is still isolated to his right hand.\par He notes some mild issues with balance and goes to Pilates to help with balance.\par He denies having any falls.\par He remains very active.\par \par He has some mild difficulty swallowing, particularly if the food is dry.\par \par He states that sleep is terrible.\par He has shoulder issues affecting his sleep.\par His wife is an active sleeper.\par He does believe that he snores.\par He has had dream enactment once or twice a year - kicks while dreaming that he is playing soccer.\par \par He is under a lot of stress due to his wife's health issues.\par He feels that his mood is situational and describes himself as fatalistic.\par \par He notes some mild short term memory issues but remains independent in his ADLs and takes care of the household finances.\par \par He was started on carbidopa/levodopa by Dr. Agarwal but does not tolerate it. He states that he is less functional when taking this medication.\par \par Current PD meds:\par Rasagaline 1 mg/day\par Ropinirole 6 mg BID\par \par He denies having side effects with this medication.\par \par \par Interval History\par 1/5/21:\par Due to connection issues with [employer], Doximity was used.\par He would like to have shoulder surgery on the left side with Dr. Briscoe at [facility].\par He was told that he would need neurological clearance.\par \par He feels that symptoms are relatively stable.\par He does sometimes notice some increased amplitude and frequency of tremors.\par Balance is stable. He is doing Pilates and working out daily.\par He rarely has some difficulty swallowing dry food.\par He feels that memory is compatible with age.\par \par His shoulder pain affect his sleep.\par \par He has undergone anesthesia before without difficulty.\par \par 4/28/22:\par He did not end up needing to change his medication. Insurance did cover the ropinirole ER.\par He had left shoulder replacement about five weeks ago. He recently started PT.\par He believes that his PD symptoms are somewhat exacerbated since surgery. He reports increased amplitude of his right hand tremor and notices it more.\par \par He reports that he did not have covid but reports brain fog. He has had multiple negative covid tests.\par He finds himself falling asleep at the computer. He is not taking pain medications for his shoulder. \par He was noticing brain fog before the surgery.\par He is not sleeping well but it is improved since before surgery and since being in an immobilizer.\par \par He denies worsening of balance.\par \par He has dreams of playing soccer and his wife reports kicking in his sleep. \par \par He has noticed some mild difficulty with swallowing if eating dry food. \par \par He has also noticed muscle cramps at night.\par \par \par 10/27/22:\par He reports feeling tired.\par He sleeps 6 hours per night and tends to doze during the day.\par He does report snoring.\par He denies recent dream enactment behavior.\par He feels that he is not back to his baseline since shoulder surgery.\par He completed PT and is exercising in his home gym.\par \par He notices an increase in frequency and amplitude in his right hand tremor. He does not notice tremor on the left.\par \par He is having leg cramps at night.\par He takes magnesium and potassium.\par He admits to inadequate water intake.\par \par \par \par \par

## 2022-10-27 NOTE — DATA REVIEWED
[de-identified] : MRI brain without contrast 11/27/15: Mild volume loss with a few foci of increased T2 and FLAIR signal in the white matter. These are nonspecific and likely minimal microvascular ischemic change. There is no restricted diffusion to suggest new ischemic change. \par There is no acute hemorrhage or midline shift. [de-identified] : Home sleep study 6/6/22: AHI 6\par Supine AHI is 8 and non-supine AHI is 0.7.

## 2023-01-26 ENCOUNTER — APPOINTMENT (OUTPATIENT)
Dept: FAMILY MEDICINE | Facility: CLINIC | Age: 73
End: 2023-01-26
Payer: MEDICARE

## 2023-01-26 ENCOUNTER — OFFICE (OUTPATIENT)
Dept: URBAN - METROPOLITAN AREA CLINIC 102 | Facility: CLINIC | Age: 73
Setting detail: OPHTHALMOLOGY
End: 2023-01-26
Payer: MEDICARE

## 2023-01-26 VITALS
OXYGEN SATURATION: 97 % | DIASTOLIC BLOOD PRESSURE: 82 MMHG | TEMPERATURE: 97.3 F | BODY MASS INDEX: 27 KG/M2 | SYSTOLIC BLOOD PRESSURE: 152 MMHG | HEIGHT: 66 IN | WEIGHT: 168 LBS | HEART RATE: 82 BPM

## 2023-01-26 DIAGNOSIS — H16.223: ICD-10-CM

## 2023-01-26 DIAGNOSIS — H25.12: ICD-10-CM

## 2023-01-26 DIAGNOSIS — H35.373: ICD-10-CM

## 2023-01-26 DIAGNOSIS — H40.013: ICD-10-CM

## 2023-01-26 DIAGNOSIS — H43.813: ICD-10-CM

## 2023-01-26 DIAGNOSIS — H26.491: ICD-10-CM

## 2023-01-26 DIAGNOSIS — Z96.1: ICD-10-CM

## 2023-01-26 DIAGNOSIS — H35.3132: ICD-10-CM

## 2023-01-26 PROCEDURE — 92133 CPTRZD OPH DX IMG PST SGM ON: CPT | Performed by: OPHTHALMOLOGY

## 2023-01-26 PROCEDURE — 92083 EXTENDED VISUAL FIELD XM: CPT | Performed by: OPHTHALMOLOGY

## 2023-01-26 PROCEDURE — 99213 OFFICE O/P EST LOW 20 MIN: CPT

## 2023-01-26 PROCEDURE — 92014 COMPRE OPH EXAM EST PT 1/>: CPT | Performed by: OPHTHALMOLOGY

## 2023-01-26 ASSESSMENT — VISUAL ACUITY
OD_BCVA: 20/30+1
OS_BCVA: 20/20-1

## 2023-01-26 ASSESSMENT — SPHEQUIV_DERIVED
OD_SPHEQUIV: -2.75
OS_SPHEQUIV: -5.875
OS_SPHEQUIV: -4.125
OD_SPHEQUIV: -3.375
OS_SPHEQUIV: -4

## 2023-01-26 ASSESSMENT — REFRACTION_MANIFEST
OD_SPHERE: -3.25
OS_AXIS: 124
OD_SPHERE: -3.25
OS_VA1: 20/20-1
OD_VA1: 20/20
OS_VA1: 20/25
OD_AXIS: 75
OS_CYLINDER: -0.50
OS_ADD: +2.50
OS_SPHERE: -3.75
OD_ADD: +2.50
OS_SPHERE: -4.00
OD_CYLINDER: SPH
OS_AXIS: 120
OD_VA1: 20/30
OS_CYLINDER: -0.25
OD_CYLINDER: -0.25

## 2023-01-26 ASSESSMENT — REFRACTION_CURRENTRX
OD_AXIS: 180
OS_CYLINDER: -0.50
OS_ADD: +2.50
OS_ADD: +2.25
OD_ADD: +2.50
OS_VPRISM_DIRECTION: SV
OS_VPRISM_DIRECTION: SV
OS_OVR_VA: 20/
OD_AXIS: 089
OS_SPHERE: -4.00
OD_ADD: +2.25
OS_OVR_VA: 20/
OS_CYLINDER: -0.50
OD_OVR_VA: 20/
OD_SPHERE: -4.00
OD_AXIS: 0
OD_SPHERE: -3.00
OD_OVR_VA: 20/
OS_VPRISM_DIRECTION: PROGS
OD_SPHERE: -2.50
OS_CYLINDER: -1.25
OD_CYLINDER: -0.50
OD_VPRISM_DIRECTION: PROGS
OD_SPHERE: -1.50
OS_SPHERE: -3.75
OD_AXIS: 90
OD_VPRISM_DIRECTION: SV
OD_OVR_VA: 20/
OD_CYLINDER: -0.25
OD_VPRISM_DIRECTION: SV
OS_ADD: +2.25
OS_OVR_VA: 20/
OS_VPRISM_DIRECTION: PROGS
OD_CYLINDER: -0.75
OD_ADD: +2.25
OS_AXIS: 117
OD_CYLINDER: 0.00
OS_CYLINDER: -0.50
OD_AXIS: 115
OS_AXIS: 96
OS_AXIS: 117
OS_SPHERE: -2.25
OD_VPRISM_DIRECTION: PROGS
OS_CYLINDER: -0.25
OS_AXIS: 078
OS_SPHERE: -2.75
OS_AXIS: 107
OD_CYLINDER: SPHERE
OD_SPHERE: -3.25
OS_SPHERE: -3.25
OS_VPRISM_DIRECTION: PROGS
OD_VPRISM_DIRECTION: PROGS

## 2023-01-26 ASSESSMENT — REFRACTION_AUTOREFRACTION
OD_AXIS: 106
OD_SPHERE: -2.50
OS_SPHERE: -5.75
OS_CYLINDER: -0.25
OD_CYLINDER: -0.50
OS_AXIS: 089

## 2023-01-26 ASSESSMENT — PACHYMETRY
OS_CT_CORRECTION: -4
OD_CT_UM: 609
OD_CT_CORRECTION: -4
OS_CT_UM: 597

## 2023-01-26 ASSESSMENT — KERATOMETRY
OS_K2POWER_DIOPTERS: 44.25
OD_AXISANGLE_DEGREES: 046
OD_K2POWER_DIOPTERS: 44.00
OS_K1POWER_DIOPTERS: 44.00
OD_K1POWER_DIOPTERS: 43.50
OS_AXISANGLE_DEGREES: 115

## 2023-01-26 ASSESSMENT — TONOMETRY
OD_IOP_MMHG: 17
OS_IOP_MMHG: 20
OD_IOP_MMHG: 17
OS_IOP_MMHG: 16

## 2023-01-26 ASSESSMENT — CORNEAL DYSTROPHY - POSTERIOR
OD_POSTERIORDYSTROPHY: T FUCHS
OS_POSTERIORDYSTROPHY: T FUCHS

## 2023-01-26 ASSESSMENT — DECREASING TEAR LAKE - SEVERITY SCORE
OD_DEC_TEARLAKE: 1+
OS_DEC_TEARLAKE: 1+

## 2023-01-26 ASSESSMENT — AXIALLENGTH_DERIVED
OS_AL: 25.0001
OS_AL: 25.8482
OD_AL: 24.8819
OD_AL: 24.6139
OS_AL: 25.0549

## 2023-01-26 ASSESSMENT — CONFRONTATIONAL VISUAL FIELD TEST (CVF)
OS_FINDINGS: FULL
OD_FINDINGS: FULL

## 2023-01-26 NOTE — PLAN
[FreeTextEntry1] : Recommended to alternate between applying ice and heat on his hematoma. Advised to use topical arnica as well and to avoid intensive exercise for the next couple of days at least. Informed to call the office if symptoms do not improve.

## 2023-01-26 NOTE — PHYSICAL EXAM
[Normal] : normal rate, regular rhythm, normal S1 and S2 and no murmur heard [de-identified] : abrasions on left hand, right leg, forehead [de-identified] : hematoma and ecchymosis of right thigh

## 2023-01-26 NOTE — HISTORY OF PRESENT ILLNESS
[FreeTextEntry8] : Patient presents with right leg swelling. He states that he had gone to visit friends in [place] a couple days ago where he had fallen on a court while playing pickle ball; resulted in abrasions on his forehead, left knee and hand, and his right leg. He was on a flight back home yesterday when he had noticed a hematoma on the upper right side of his right leg which has not gone down since. He denies taking anything for his symptoms. He has a history of Parkinsons and has noticed it acting up more than usual as well since his fall.

## 2023-01-26 NOTE — REVIEW OF SYSTEMS
[Negative] : Heme/Lymph [FreeTextEntry9] : hematoma on upper right leg [de-identified] : abrasions on left hand, right leg, and forehead

## 2023-01-26 NOTE — ASSESSMENT
[FreeTextEntry1] : Physical exam reveals a non-tense hematoma on his right leg along with abrasions. Hematoma appears to be situated between soft tissue. \par \par

## 2023-01-26 NOTE — HEALTH RISK ASSESSMENT
[Never] : Never [Any fall with injury in past year] : Patient reported fall with injury in the past year [0] : 2) Feeling down, depressed, or hopeless: Not at all (0) [PHQ-2 Negative - No further assessment needed] : PHQ-2 Negative - No further assessment needed [[id number]] : 0

## 2023-02-03 ENCOUNTER — OUTPATIENT (OUTPATIENT)
Dept: OUTPATIENT SERVICES | Facility: HOSPITAL | Age: 73
LOS: 1 days | End: 2023-02-03
Payer: MEDICARE

## 2023-02-03 ENCOUNTER — APPOINTMENT (OUTPATIENT)
Dept: FAMILY MEDICINE | Facility: CLINIC | Age: 73
End: 2023-02-03
Payer: MEDICARE

## 2023-02-03 ENCOUNTER — APPOINTMENT (OUTPATIENT)
Dept: ULTRASOUND IMAGING | Facility: CLINIC | Age: 73
End: 2023-02-03
Payer: MEDICARE

## 2023-02-03 VITALS
HEIGHT: 66 IN | BODY MASS INDEX: 27 KG/M2 | SYSTOLIC BLOOD PRESSURE: 132 MMHG | TEMPERATURE: 97 F | HEART RATE: 87 BPM | WEIGHT: 168 LBS | OXYGEN SATURATION: 97 % | DIASTOLIC BLOOD PRESSURE: 72 MMHG

## 2023-02-03 DIAGNOSIS — M79.89 OTHER SPECIFIED SOFT TISSUE DISORDERS: ICD-10-CM

## 2023-02-03 PROCEDURE — 93971 EXTREMITY STUDY: CPT

## 2023-02-03 PROCEDURE — 99213 OFFICE O/P EST LOW 20 MIN: CPT

## 2023-02-03 PROCEDURE — 93971 EXTREMITY STUDY: CPT | Mod: 26,RT

## 2023-02-03 NOTE — REVIEW OF SYSTEMS
[Negative] : Heme/Lymph [Joint Swelling] : joint swelling [FreeTextEntry9] : hematoma, right leg swelling

## 2023-02-03 NOTE — HISTORY OF PRESENT ILLNESS
[FreeTextEntry8] : Patient presents with leg swelling. He was seen in our office last week regarding a hematoma on his upper right leg. There has been no improvement since and the hematoma has migrated down to his calves. He states that his condition is better when he first gets up in the morning; leg is less tense/swollen. He has been keeping his legs slightly elevated and has been applying heat; treatments have been effective. He denies any SOB.

## 2023-02-03 NOTE — PHYSICAL EXAM
[Normal] : no acute distress, well nourished, well developed and well-appearing [de-identified] : hematoma and ecchymosis of right thigh, swelling of right lower leg [de-identified] : healing abrasions on left hand, right leg, forehead

## 2023-02-03 NOTE — PLAN
[FreeTextEntry1] : Ordered ultrasound of the right leg.  Recommended to elevate feet and to apply heat.

## 2023-02-03 NOTE — ASSESSMENT
[FreeTextEntry1] : Swelling of the lower leg can be indicative of the hematoma applying pressure on saphenous venous return, resulting in decreased blood flow and pedal edema. \par \par \par

## 2023-02-12 ENCOUNTER — NON-APPOINTMENT (OUTPATIENT)
Age: 73
End: 2023-02-12

## 2023-02-13 ENCOUNTER — APPOINTMENT (OUTPATIENT)
Dept: FAMILY MEDICINE | Facility: CLINIC | Age: 73
End: 2023-02-13
Payer: MEDICARE

## 2023-02-13 ENCOUNTER — NON-APPOINTMENT (OUTPATIENT)
Age: 73
End: 2023-02-13

## 2023-02-13 ENCOUNTER — APPOINTMENT (OUTPATIENT)
Dept: CARDIOLOGY | Facility: CLINIC | Age: 73
End: 2023-02-13
Payer: MEDICARE

## 2023-02-13 VITALS
TEMPERATURE: 97.2 F | SYSTOLIC BLOOD PRESSURE: 102 MMHG | OXYGEN SATURATION: 97 % | HEIGHT: 66 IN | WEIGHT: 176 LBS | HEART RATE: 98 BPM | DIASTOLIC BLOOD PRESSURE: 62 MMHG | BODY MASS INDEX: 28.28 KG/M2

## 2023-02-13 VITALS
WEIGHT: 176 LBS | DIASTOLIC BLOOD PRESSURE: 62 MMHG | SYSTOLIC BLOOD PRESSURE: 130 MMHG | OXYGEN SATURATION: 98 % | HEART RATE: 95 BPM | BODY MASS INDEX: 28.41 KG/M2

## 2023-02-13 PROCEDURE — 93000 ELECTROCARDIOGRAM COMPLETE: CPT

## 2023-02-13 PROCEDURE — 99214 OFFICE O/P EST MOD 30 MIN: CPT

## 2023-02-13 PROCEDURE — 93971 EXTREMITY STUDY: CPT

## 2023-02-13 PROCEDURE — 99213 OFFICE O/P EST LOW 20 MIN: CPT

## 2023-02-13 NOTE — PHYSICAL EXAM
[No Rash] : no rash [Coordination Grossly Intact] : coordination grossly intact [No Focal Deficits] : no focal deficits [Normal] : affect was normal and insight and judgment were intact [de-identified] : swelling of right upper and lower leg [de-identified] : tremor of right hand

## 2023-02-13 NOTE — ADDENDUM
[FreeTextEntry1] : Note entered by Tania Joya, acting as scribe for Dr.Patrick Nolen.\par 
detailed exam

## 2023-02-13 NOTE — REVIEW OF SYSTEMS
[Negative] : Heme/Lymph [FreeTextEntry9] : right leg swelling [de-identified] : paresthesias of legs and feet

## 2023-02-13 NOTE — DISCUSSION/SUMMARY
[Hyperlipidemia] : hyperlipidemia [Hypertension] : hypertension [Patient] : the patient [EKG obtained to assist in diagnosis and management of assessed problem(s)] : EKG obtained to assist in diagnosis and management of assessed problem(s) [de-identified] : s [FreeTextEntry1] : Pt will continue current medications. Various options for lipid lowering were discussed. Pt will continue RYR.  Discussion of calcium score was entertained. Pt will be followed in 4 months.

## 2023-02-13 NOTE — HISTORY OF PRESENT ILLNESS
[FreeTextEntry1] : 72 yo male presents for follow up. Pt has a history of HLD, HTN, PD. H/O prostatectomy. Pt denies chest pain. He reports mild dyspnea with exertion. . Medications were reviewed. Previous testing and visits were reviewed. Pt denies chest pain or shortness of breath. Pt fell appx 3 weeks ago and experiences swelling in his right leg.

## 2023-02-13 NOTE — HISTORY OF PRESENT ILLNESS
[FreeTextEntry8] : Patient presents with leg swelling. This is his third visit regarding a hematoma on his right upper leg within the past three weeks; has observed only a marginal improvement within this time frame. He had an US ordered last visit (2/3) as well as one done at cardiologist Dr. Osullivan's office today during his 6 month follow up; both were wnl. He has been continuing to treat his symptoms via elevating his legs and applying heat. \par

## 2023-02-20 ENCOUNTER — TRANSCRIPTION ENCOUNTER (OUTPATIENT)
Age: 73
End: 2023-02-20

## 2023-03-05 ENCOUNTER — TRANSCRIPTION ENCOUNTER (OUTPATIENT)
Age: 73
End: 2023-03-05

## 2023-03-13 ENCOUNTER — APPOINTMENT (OUTPATIENT)
Dept: NEUROLOGY | Facility: CLINIC | Age: 73
End: 2023-03-13
Payer: MEDICARE

## 2023-03-13 VITALS
BODY MASS INDEX: 27.64 KG/M2 | WEIGHT: 172 LBS | TEMPERATURE: 97.8 F | DIASTOLIC BLOOD PRESSURE: 78 MMHG | HEART RATE: 93 BPM | SYSTOLIC BLOOD PRESSURE: 128 MMHG | HEIGHT: 66 IN

## 2023-03-13 DIAGNOSIS — M79.2 NEURALGIA AND NEURITIS, UNSPECIFIED: ICD-10-CM

## 2023-03-13 DIAGNOSIS — E53.8 DEFICIENCY OF OTHER SPECIFIED B GROUP VITAMINS: ICD-10-CM

## 2023-03-13 PROCEDURE — 99213 OFFICE O/P EST LOW 20 MIN: CPT

## 2023-03-13 NOTE — PHYSICAL EXAM
[FreeTextEntry1] : Neurological:\par Memory: normal memory, oriented to person, place, time\par Language intact/no aphasia\par Cranial Nerves: II-XII intact, Pupils equally round and reactive to light, ocular muscles/movements intact, no ptosis, no facial weakness, tongue protrudes normally in the midline, slight hypophonia\par Motor: normal tone, no pronator drift, full strength in all four extremities in the proximal and distal muscle groups\par Coordination: Decreased amplitude and speed of rapid alternating movements on right. + rest tremor on right. No cogwheel rigidity. Finger to nose intact bilaterally\par Sensory: Decreased sensation in left forearm\par DTRs: symmetric, 1+ in b/l triceps, 1+ in b/l biceps, 1+ in b/l brachioradialis, 1+ in bilateral patellars\par Gait: narrow based, steady, good stride length, reduced arm swing, right tremor observed while walking\par

## 2023-03-13 NOTE — DISCUSSION/SUMMARY
[FreeTextEntry1] : Mr. Schulz is a 73 year old man with a right hand tremor.\par He was diagnosed with Parkinson's Disease in 2015.\par \par Parkinson's Disease\par -He reports some mild worsening of symptoms; he would like to avoid increasing medications at this time.\par -Continue rasagiline 1 mg/day\par -Continue Ropinirole ER 6 mg BID. Prior to adding more medication, I suggest shifting the timing of his medication to early morning and mid afternoon. If this is not helpful,  Advised we can add an immediate release dose mid-day. He should call me in one month to let me know how this is working.\par -He has not tolerated carbidopa/levodopa in the past but is willing to try it again if needed.\par -Continue daily exercise (with appropriate footwear).\par \par Snoring, poor sleep\par -Home sleep study showed mild CARTER, not significant when non-supine.\par He prefers to sleep on his side and will continue with positional therapy for now.\par -Increase sleep duration at night. Cat naps if needed.\par -Discussed sleep hygiene\par \par Leg Cramps\par -Overall stable\par -Increase hydration during the day.\par -Continue magnesium supplements\par \par Dream Enactment Behavior\par -Rare episodes of dream enactment behavior.\par -Suggest addition of melatonin 3 mg ~ 1 hour prior to bedtime which may help with sleep duration as well.\par \par Discomfort in feet\par -He may have some early neuropathy\par -He is not interested in medications at this time.\par -Will check labs - TSH, HbA1C, vitamin B12, SPEP\par -If symptoms worsen can check EMG.\par \par follow-up 6 months, sooner if needed. \par

## 2023-03-13 NOTE — DATA REVIEWED
[de-identified] : MRI brain without contrast 11/27/15: Mild volume loss with a few foci of increased T2 and FLAIR signal in the white matter. These are nonspecific and likely minimal microvascular ischemic change. There is no restricted diffusion to suggest new ischemic change. \par There is no acute hemorrhage or midline shift. [de-identified] : Home sleep study 6/6/22: AHI 6\par Supine AHI is 8 and non-supine AHI is 0.7.

## 2023-03-14 LAB
TSH SERPL-ACNC: 1.37 UIU/ML
VIT B12 SERPL-MCNC: 601 PG/ML

## 2023-03-16 ENCOUNTER — NON-APPOINTMENT (OUTPATIENT)
Age: 73
End: 2023-03-16

## 2023-03-16 LAB
ALBUMIN MFR SERPL ELPH: 58.2 %
ALBUMIN SERPL-MCNC: 4.4 G/DL
ALBUMIN/GLOB SERPL: 1.4 RATIO
ALPHA1 GLOB MFR SERPL ELPH: 3.8 %
ALPHA1 GLOB SERPL ELPH-MCNC: 0.3 G/DL
ALPHA2 GLOB MFR SERPL ELPH: 8.3 %
ALPHA2 GLOB SERPL ELPH-MCNC: 0.6 G/DL
B-GLOBULIN MFR SERPL ELPH: 11.7 %
B-GLOBULIN SERPL ELPH-MCNC: 0.9 G/DL
GAMMA GLOB FLD ELPH-MCNC: 1.4 G/DL
GAMMA GLOB MFR SERPL ELPH: 18 %
INTERPRETATION SERPL IEP-IMP: NORMAL
PROT SERPL-MCNC: 7.5 G/DL
PROT SERPL-MCNC: 7.5 G/DL

## 2023-03-20 ENCOUNTER — TRANSCRIPTION ENCOUNTER (OUTPATIENT)
Age: 73
End: 2023-03-20

## 2023-03-28 ENCOUNTER — APPOINTMENT (OUTPATIENT)
Dept: VASCULAR SURGERY | Facility: CLINIC | Age: 73
End: 2023-03-28
Payer: MEDICARE

## 2023-03-28 VITALS
DIASTOLIC BLOOD PRESSURE: 73 MMHG | HEIGHT: 66 IN | HEART RATE: 89 BPM | SYSTOLIC BLOOD PRESSURE: 131 MMHG | BODY MASS INDEX: 27.64 KG/M2 | WEIGHT: 172 LBS

## 2023-03-28 DIAGNOSIS — M79.89 OTHER SPECIFIED SOFT TISSUE DISORDERS: ICD-10-CM

## 2023-03-28 PROCEDURE — 99204 OFFICE O/P NEW MOD 45 MIN: CPT

## 2023-03-28 RX ORDER — HYDROCHLOROTHIAZIDE 12.5 MG/1
12.5 TABLET ORAL DAILY
Qty: 90 | Refills: 3 | Status: COMPLETED | COMMUNITY
End: 2023-03-28

## 2023-03-28 NOTE — ASSESSMENT
[FreeTextEntry1] : 73-year-old male with edematous right leg occurring after a traumatic injury but also edema on the left leg as well that is considerable.  I noted that the absence of DVT on duplex imaging was excellent but some of his edema may be related to lymphatic injury from the trauma and perhaps also related to one of his antiparkinsonian medications (ropinirole).\par \par I suggested evaluation by an orthopedic surgeon, despite his absence of pain or tenderness, and provided him the names of Dr. Jean Villegas and Dr. Mariano Nickerson.\par \par In addition, he states he will be traveling to [facility] sometime in June and I suggested a repeat venous duplex study before this trip.  Lastly, I suggested utilization of support hose.  All questions were answered.

## 2023-03-28 NOTE — PHYSICAL EXAM
[JVD] : no jugular venous distention  [Normal Thyroid] : the thyroid was normal [Carotid Bruits] : no carotid bruits [Normal Breath Sounds] : Normal breath sounds [Normal Heart Sounds] : normal heart sounds [Normal Rate and Rhythm] : normal rate and rhythm [Abdomen Masses] : No abdominal masses [Abdomen Tenderness] : ~T ~M No abdominal tenderness [Alert] : alert [Oriented to Person] : oriented to person [Oriented to Place] : oriented to place [Oriented to Time] : oriented to time [de-identified] : well nourished [de-identified] : HEENT, PERRLA, EOMi, sclerae anicteric, conjunctivae pink and moist [FreeTextEntry1] : 2-2+ femoral, popliteal and dorsalis pedis pulses bilaterally; both feet pink, warm, well-perfused without ulcers, cyanosis, or gangrenous changes; 2+ edema on the right leg and 1+ on the left; no Homans' sign, cellulitis, or calf tenderness [de-identified] : Parkinsonian-like tremor

## 2023-03-28 NOTE — HISTORY OF PRESENT ILLNESS
[FreeTextEntry1] : 73-year-old white male with past medical history significant for Parkinson's disease diagnosed in 2018, hypertension, hypercholesterolemia, vitamin D deficiency, prostate cancer status post radical prostatectomy in 2003, GERD, ED presents for evaluation of a right edematous leg that occurred after he fell during a pickleball instruction about 2 months ago.  The hematoma and ecchymosis slowly resolved but the edema which was quite significant initially still remains noticeable.  He has undergone 2 right lower extremity venous duplex studies: 1 on February 3 and a second on February 13 both of which were negative for DVTs.  He denies pain or tenderness associated with his right knee or leg and is able to ambulate and even run without difficulty.  The edema is improved in the morning upon arising and slowly worsens over the course of the day when his legs in a dependent location.

## 2023-04-05 ENCOUNTER — APPOINTMENT (OUTPATIENT)
Dept: ORTHOPEDIC SURGERY | Facility: CLINIC | Age: 73
End: 2023-04-05
Payer: MEDICARE

## 2023-04-05 VITALS
SYSTOLIC BLOOD PRESSURE: 142 MMHG | WEIGHT: 172 LBS | HEIGHT: 66 IN | DIASTOLIC BLOOD PRESSURE: 78 MMHG | BODY MASS INDEX: 27.64 KG/M2 | HEART RATE: 84 BPM

## 2023-04-05 PROCEDURE — 73560 X-RAY EXAM OF KNEE 1 OR 2: CPT | Mod: RT

## 2023-04-05 PROCEDURE — 99202 OFFICE O/P NEW SF 15 MIN: CPT

## 2023-04-06 NOTE — PHYSICAL EXAM
[de-identified] : Right Knee:\par Knee: Range of Motion in Degrees	\par 	                  Claimant:	Normal:	\par Flexion Active	  135 	                135-degrees	\par Flexion Passive	  135	                135-degrees	\par Extension Active	  0-5	                0-5-degrees	\par Extension Passive	  0-5	                0-5-degrees	\par \par 2+ pitting edema distally.  No weakness to flexion/extension.  No evidence of instability in the AP plane or varus or valgus stress.  Negative  Lachman.  Negative pivot shift.  Negative anterior drawer test.  Negative posterior drawer test.  Negative Kori.  Negative Apley grind.  No medial or lateral joint line tenderness.  No tenderness over the medial and lateral facet of the patella.  No patellofemoral crepitations.  No lateral tilting patella.  No patellar apprehension.  No crepitation in the medial and lateral femoral condyle.  No proximal or distal swelling, edema or tenderness.  No gross motor or sensory deficits.  No intra-articular swelling.  2+ DP and PT pulses. No varus or valgus malalignment.  \par \par Left Knee:\par Knee: Range of Motion in Degrees	\par 	                  Claimant:	Normal:	\par Flexion Active	  135 	                135-degrees	\par Flexion Passive	  135	                135-degrees	\par Extension Active	  0-5	                0-5-degrees	\par Extension Passive	  0-5	                0-5-degrees	\par \par No weakness to flexion/extension.  No evidence of instability in the AP plane or varus or valgus stress.  Negative  Lachman.  Negative pivot shift.  Negative anterior drawer test.  Negative posterior drawer test.  Negative Kori.  Negative Apley grind.  No medial or lateral joint line tenderness.  No tenderness over the medial and lateral facet of the patella.  No patellofemoral crepitations.  No lateral tilting patella.  No patellar apprehension.  No crepitation in the medial and lateral femoral condyle.  No proximal or distal swelling, edema or tenderness.  No gross motor or sensory deficits.  No intra-articular swelling.  2+ DP and PT pulses. No varus or valgus malalignment.  Skin is intact.  No rashes, scars or lesions.  \par   \par Right Ankle:\par Ankle: Range of Motion in Degrees:\par 	                                Claimant:	                Normal:	\par Dorsiflexion (Active)	40-degrees	40-degrees	\par Dorsiflexion (Passive)	40-degrees	40-degrees	\par Plantar (Active)	                40-degrees	40-degrees	\par Plantar (Passive)	                40-degrees	40-degrees	\par Inversion (Active)	                30-degrees	30-degrees	\par Inversion (Passive)	                30-degrees	30-degrees	\par Eversion  (Active)	                20-degrees	20-degrees	\par Eversion (Passive) 	                20-degrees	20-degrees	\par \par 2+ pitting edema distally.  No weakness in dorsiflexion, plantar flexion, inversion or eversion.  Normal sensation.  No tenderness over the medial or lateral ligaments.  No tenderness over the DLES.  No evidence of instability.  Negative anterior drawer sign.  No medial or lateral bony tenderness.  No proximal fibular tenderness.  No anterior, posterior, medial or lateral tendon tenderness.  No intra-articular swelling.  No extra-articular swelling, edema or tenderness.  No tenderness over the plantar aspect of the os calcis.  2+ DP and PT pulses. \par \par Left Ankle:\par Ankle: Range of Motion in Degrees:\par 	                                Claimant:	                Normal:	\par Dorsiflexion (Active)	40-degrees	40-degrees	\par Dorsiflexion (Passive)	40-degrees	40-degrees	\par Plantar (Active)	                40-degrees	40-degrees	\par Plantar (Passive)	                40-degrees	40-degrees	\par Inversion (Active)	                30-degrees	30-degrees	\par Inversion (Passive)	                30-degrees	30-degrees	\par Eversion  (Active)	                20-degrees	20-degrees	\par Eversion (Passive) 	                20-degrees	20-degrees	\par \par No weakness in dorsiflexion, plantar flexion, inversion or eversion.  Normal sensation.  No tenderness over the medial or lateral ligaments.  No tenderness over the DLES.  No evidence of instability.  Negative anterior drawer sign.  No medial or lateral bony tenderness.  No proximal fibular tenderness.  No anterior, posterior, medial or lateral tendon tenderness.  No intra-articular swelling.  No extra-articular swelling, edema or tenderness.  No tenderness over the plantar aspect of the os calcis.  2+ DP and PT pulses. Skin is intact.  No rashes, scars or lesions.  \par   [de-identified] : Gait and Station:  Ambulating with a slightly antalgic to antalgic gait.  Station:  Normal.  [de-identified] : Appearance:  Well-developed, well-nourished male in no acute distress.\par   [de-identified] : Radiographs, one to two views of the right knee taken in the office today, show no obvious osseous abnormalities.

## 2023-04-06 NOTE — DISCUSSION/SUMMARY
[de-identified] : The patient presents with residual secondary to resolved hematoma of the right lower extremity.  At this point, he is in physical therapy.  He is getting compression stockings.  I do not believe there is anything more I could add to his care.

## 2023-04-06 NOTE — HISTORY OF PRESENT ILLNESS
[(Does not interfere) 0] : the ailment interference is 0/10 (does not interfere) [1] : the ailment interference is 1/10 [3] : the ailment interference is 3/10 [de-identified] : The patient comes in today status post a fall in January while playing pickelball and developed a hematoma, which has gone down.  He is left with some residual swelling distally.  He has no complaints to his hip, knee or ankle.  The patient states the onset/injury occurred 01/24/2023.  This injury is not work related or due to an automobile accident.  The patient describes the pain as pressure. [de-identified] : walking, standing  [de-identified] : elevation [] : No [de-identified] : Retrograde massage [de-identified] : "Tentative on stairs".

## 2023-04-06 NOTE — ADDENDUM
[FreeTextEntry1] : This note was written by Melissa Xavier on 04/06/2023 acting as scribe for Jean Villegas III, MD

## 2023-04-06 NOTE — CONSULT LETTER
[Dear  ___] : Dear  [unfilled], [Consult Letter:] : I had the pleasure of evaluating your patient, [unfilled]. [Please see my note below.] : Please see my note below. [Consult Closing:] : Thank you very much for allowing me to participate in the care of this patient.  If you have any questions, please do not hesitate to contact me. [Sincerely,] : Sincerely, [FreeTextEntry3] : Jean Villegas, III, MD\par

## 2023-04-06 NOTE — REVIEW OF SYSTEMS
[Joint Pain] : joint pain [Lower Ext Edema] : lower extremity edema [Heartburn] : heartburn [Negative] : Heme/Lymph

## 2023-05-19 ENCOUNTER — APPOINTMENT (OUTPATIENT)
Dept: FAMILY MEDICINE | Facility: CLINIC | Age: 73
End: 2023-05-19
Payer: MEDICARE

## 2023-05-19 VITALS
SYSTOLIC BLOOD PRESSURE: 132 MMHG | BODY MASS INDEX: 27.64 KG/M2 | WEIGHT: 172 LBS | HEART RATE: 97 BPM | DIASTOLIC BLOOD PRESSURE: 82 MMHG | HEIGHT: 66 IN | TEMPERATURE: 97.3 F | OXYGEN SATURATION: 98 %

## 2023-05-19 DIAGNOSIS — Z79.2 LONG TERM (CURRENT) USE OF ANTIBIOTICS: ICD-10-CM
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
## 2023-05-19 PROCEDURE — 99213 OFFICE O/P EST LOW 20 MIN: CPT

## 2023-05-19 NOTE — REVIEW OF SYSTEMS
[Negative] : Heme/Lymph [FreeTextEntry9] : right leg swelling [de-identified] : tick bite [de-identified] : paresthesias of legs and feet

## 2023-05-19 NOTE — PLAN
[FreeTextEntry1] : Will prescribe prophylactic doxycycline for Lyme disease. Follow up if rash worsens.\par \par Will also prescribe prophylactic amoxicillin for dental work but he was advised to discuss with his orthopedist if he really needs to continue this.

## 2023-05-19 NOTE — HEALTH RISK ASSESSMENT
[No falls in past year] : Patient reported no falls in the past year [0] : 2) Feeling down, depressed, or hopeless: Not at all (0) [PHQ-2 Negative - No further assessment needed] : PHQ-2 Negative - No further assessment needed [Never] : Never [[id number]] : 0

## 2023-05-19 NOTE — PHYSICAL EXAM
[No Rash] : no rash [Coordination Grossly Intact] : coordination grossly intact [No Focal Deficits] : no focal deficits [Normal] : affect was normal and insight and judgment were intact [de-identified] : swelling of right upper and lower leg [de-identified] : tremor of right hand

## 2023-05-19 NOTE — ASSESSMENT
[FreeTextEntry1] : He has a tick bite which appears inflamed but there is no EM rash. The inflammation appears to be a reaction to the tick bite rather than cellulitis.\par \par He also requests prophylactic antibiotics for dental work due to his shoulder replacement. The surgery was in 3/2022 so it is unclear if he still needs antibiotic prophylaxis.

## 2023-05-19 NOTE — HISTORY OF PRESENT ILLNESS
[FreeTextEntry8] : Patient presents with a tick bite on his left flank. He believes he was bitten 4-5 days ago. He believes the tick was attached for less than 24 hours. His wife was able to remove the tick and he believes she removed the entire tick. The area around the bite is red and irritated.\par \par He also has a dental appointment coming up and needs antibiotics prior due to his shoulder replacement.

## 2023-05-22 ENCOUNTER — APPOINTMENT (OUTPATIENT)
Dept: VASCULAR SURGERY | Facility: CLINIC | Age: 73
End: 2023-05-22
Payer: MEDICARE

## 2023-05-22 VITALS
WEIGHT: 172 LBS | HEART RATE: 67 BPM | HEIGHT: 66 IN | DIASTOLIC BLOOD PRESSURE: 84 MMHG | SYSTOLIC BLOOD PRESSURE: 130 MMHG | BODY MASS INDEX: 27.64 KG/M2

## 2023-05-22 DIAGNOSIS — S80.11XA CONTUSION OF RIGHT LOWER LEG, INITIAL ENCOUNTER: ICD-10-CM

## 2023-05-22 PROCEDURE — 99212 OFFICE O/P EST SF 10 MIN: CPT

## 2023-05-22 PROCEDURE — 93970 EXTREMITY STUDY: CPT

## 2023-05-22 NOTE — REASON FOR VISIT
[Follow-Up: _____] : a [unfilled] follow-up visit [FreeTextEntry1] : Discuss venous duplex study done today

## 2023-05-22 NOTE — ASSESSMENT
[FreeTextEntry1] : Patient with right lower extremity edema and some of the left leg as well but no evidence of deep vein thrombosis seen on venous duplex studies.\par \par I reviewed the results of the venous duplex study with the patient and suggested continued use of compression stockings on a consistent basis.\par \par All questions were answered.\par \par He will follow-up with me in 1 years time for routine reevaluation or sooner should circumstances require.

## 2023-05-22 NOTE — HISTORY OF PRESENT ILLNESS
[FreeTextEntry1] : Patient presents after venous duplex study performed today demonstrating no evidence of either deep vein thrombosis or valvular incompetence bilaterally.\par \par He continues to note an increased size of his right lower extremity despite utilizing compression stockings on a intermittent basis

## 2023-06-01 ENCOUNTER — OFFICE (OUTPATIENT)
Dept: URBAN - METROPOLITAN AREA CLINIC 102 | Facility: CLINIC | Age: 73
Setting detail: OPHTHALMOLOGY
End: 2023-06-01
Payer: MEDICARE

## 2023-06-01 DIAGNOSIS — H35.3132: ICD-10-CM

## 2023-06-01 DIAGNOSIS — H26.491: ICD-10-CM

## 2023-06-01 DIAGNOSIS — H16.223: ICD-10-CM

## 2023-06-01 DIAGNOSIS — H43.813: ICD-10-CM

## 2023-06-01 DIAGNOSIS — H35.373: ICD-10-CM

## 2023-06-01 DIAGNOSIS — H40.013: ICD-10-CM

## 2023-06-01 DIAGNOSIS — H25.12: ICD-10-CM

## 2023-06-01 DIAGNOSIS — Z96.1: ICD-10-CM

## 2023-06-01 PROCEDURE — 99213 OFFICE O/P EST LOW 20 MIN: CPT | Performed by: OPHTHALMOLOGY

## 2023-06-01 PROCEDURE — 92134 CPTRZ OPH DX IMG PST SGM RTA: CPT | Performed by: OPHTHALMOLOGY

## 2023-06-01 ASSESSMENT — REFRACTION_CURRENTRX
OS_VPRISM_DIRECTION: PROGS
OS_ADD: +2.25
OD_ADD: +2.25
OS_CYLINDER: -0.25
OS_SPHERE: -2.25
OD_AXIS: 90
OD_CYLINDER: 0.00
OS_OVR_VA: 20/
OD_OVR_VA: 20/
OD_SPHERE: -3.25
OS_CYLINDER: -0.50
OS_SPHERE: -3.75
OS_AXIS: 107
OD_SPHERE: -1.50
OD_CYLINDER: -0.75
OS_AXIS: 117
OD_OVR_VA: 20/
OS_AXIS: 078
OS_SPHERE: -2.75
OS_VPRISM_DIRECTION: PROGS
OS_CYLINDER: -1.25
OS_ADD: +2.25
OS_SPHERE: -4.00
OS_OVR_VA: 20/
OS_VPRISM_DIRECTION: SV
OD_CYLINDER: -0.50
OD_VPRISM_DIRECTION: PROGS
OD_VPRISM_DIRECTION: PROGS
OD_AXIS: 180
OD_ADD: +2.25
OD_SPHERE: -2.50
OS_VPRISM_DIRECTION: PROGS
OD_SPHERE: -3.00
OD_OVR_VA: 20/
OD_VPRISM_DIRECTION: SV
OS_OVR_VA: 20/
OS_CYLINDER: -0.50
OD_AXIS: 089
OD_ADD: +2.50
OS_AXIS: 117
OS_CYLINDER: -0.50
OD_SPHERE: -4.00
OD_CYLINDER: -0.25
OD_VPRISM_DIRECTION: SV
OD_VPRISM_DIRECTION: PROGS
OD_AXIS: 0
OD_CYLINDER: SPHERE
OD_AXIS: 115
OS_ADD: +2.50
OS_SPHERE: -3.25
OS_AXIS: 96
OS_VPRISM_DIRECTION: SV

## 2023-06-01 ASSESSMENT — PACHYMETRY
OS_CT_UM: 597
OD_CT_UM: 609
OD_CT_CORRECTION: -4
OS_CT_CORRECTION: -4

## 2023-06-01 ASSESSMENT — CONFRONTATIONAL VISUAL FIELD TEST (CVF)
OS_FINDINGS: FULL
OD_FINDINGS: FULL

## 2023-06-01 ASSESSMENT — REFRACTION_MANIFEST
OS_VA1: 20/20-1
OD_VA1: 20/30
OD_AXIS: 75
OD_CYLINDER: -0.25
OS_AXIS: 124
OS_ADD: +2.50
OD_VA1: 20/20
OD_SPHERE: -3.25
OD_ADD: +2.50
OS_CYLINDER: -0.25
OD_SPHERE: -3.25
OS_CYLINDER: -0.50
OS_AXIS: 120
OD_CYLINDER: SPH
OS_SPHERE: -3.75
OS_VA1: 20/25
OS_SPHERE: -4.00

## 2023-06-01 ASSESSMENT — SPHEQUIV_DERIVED
OS_SPHEQUIV: -6.125
OS_SPHEQUIV: -4
OD_SPHEQUIV: -2.5
OS_SPHEQUIV: -4.125
OD_SPHEQUIV: -3.375

## 2023-06-01 ASSESSMENT — VISUAL ACUITY
OS_BCVA: 20/30
OD_BCVA: 20/30+1

## 2023-06-01 ASSESSMENT — KERATOMETRY
OD_K2POWER_DIOPTERS: 44.00
OS_AXISANGLE_DEGREES: 090
OS_K1POWER_DIOPTERS: 44.00
OS_K2POWER_DIOPTERS: 44.00
OD_AXISANGLE_DEGREES: 025
OD_K1POWER_DIOPTERS: 43.50

## 2023-06-01 ASSESSMENT — AXIALLENGTH_DERIVED
OS_AL: 25.0517
OS_AL: 26.0214
OS_AL: 25.1068
OD_AL: 24.8819
OD_AL: 24.5084

## 2023-06-01 ASSESSMENT — TONOMETRY
OD_IOP_MMHG: 19
OS_IOP_MMHG: 20

## 2023-06-01 ASSESSMENT — DECREASING TEAR LAKE - SEVERITY SCORE
OS_DEC_TEARLAKE: 1+
OD_DEC_TEARLAKE: 1+

## 2023-06-01 ASSESSMENT — CORNEAL DYSTROPHY - POSTERIOR
OS_POSTERIORDYSTROPHY: T FUCHS
OD_POSTERIORDYSTROPHY: T FUCHS

## 2023-06-01 ASSESSMENT — REFRACTION_AUTOREFRACTION
OD_CYLINDER: -1.00
OD_AXIS: 104
OS_SPHERE: -6.00
OS_CYLINDER: -0.25
OS_AXIS: 098
OD_SPHERE: -2.00

## 2023-08-08 ENCOUNTER — OFFICE (OUTPATIENT)
Dept: URBAN - METROPOLITAN AREA CLINIC 102 | Facility: CLINIC | Age: 73
Setting detail: OPHTHALMOLOGY
End: 2023-08-08
Payer: MEDICARE

## 2023-08-08 DIAGNOSIS — H35.3132: ICD-10-CM

## 2023-08-08 DIAGNOSIS — H26.491: ICD-10-CM

## 2023-08-08 DIAGNOSIS — H25.12: ICD-10-CM

## 2023-08-08 DIAGNOSIS — H16.223: ICD-10-CM

## 2023-08-08 DIAGNOSIS — H25.89: ICD-10-CM

## 2023-08-08 DIAGNOSIS — Z96.1: ICD-10-CM

## 2023-08-08 DIAGNOSIS — H40.013: ICD-10-CM

## 2023-08-08 DIAGNOSIS — H35.373: ICD-10-CM

## 2023-08-08 DIAGNOSIS — H43.813: ICD-10-CM

## 2023-08-08 PROCEDURE — 99214 OFFICE O/P EST MOD 30 MIN: CPT | Performed by: OPHTHALMOLOGY

## 2023-08-08 PROCEDURE — 92134 CPTRZ OPH DX IMG PST SGM RTA: CPT | Performed by: OPHTHALMOLOGY

## 2023-08-08 ASSESSMENT — REFRACTION_CURRENTRX
OS_SPHERE: -3.75
OD_CYLINDER: -0.75
OS_SPHERE: -2.25
OD_SPHERE: -4.00
OS_CYLINDER: -0.50
OS_CYLINDER: -0.50
OS_ADD: +2.25
OD_OVR_VA: 20/
OD_SPHERE: -3.25
OS_AXIS: 117
OS_AXIS: 96
OD_ADD: +2.25
OS_AXIS: 078
OD_VPRISM_DIRECTION: PROGS
OS_ADD: +2.50
OD_OVR_VA: 20/
OD_CYLINDER: SPHERE
OD_SPHERE: -1.50
OD_AXIS: 089
OD_VPRISM_DIRECTION: PROGS
OS_OVR_VA: 20/
OD_OVR_VA: 20/
OS_VPRISM_DIRECTION: PROGS
OD_ADD: +2.50
OS_VPRISM_DIRECTION: SV
OD_VPRISM_DIRECTION: PROGS
OD_SPHERE: -3.00
OD_AXIS: 0
OD_CYLINDER: -0.50
OS_AXIS: 117
OD_CYLINDER: -0.25
OS_VPRISM_DIRECTION: PROGS
OS_OVR_VA: 20/
OS_AXIS: 107
OD_AXIS: 180
OD_CYLINDER: 0.00
OS_VPRISM_DIRECTION: SV
OS_CYLINDER: -0.50
OS_SPHERE: -3.25
OD_AXIS: 115
OS_VPRISM_DIRECTION: PROGS
OS_CYLINDER: -0.25
OD_VPRISM_DIRECTION: SV
OS_SPHERE: -2.75
OD_AXIS: 90
OS_ADD: +2.25
OS_OVR_VA: 20/
OD_SPHERE: -2.50
OS_CYLINDER: -1.25
OD_VPRISM_DIRECTION: SV
OD_ADD: +2.25
OS_SPHERE: -4.00

## 2023-08-08 ASSESSMENT — CONFRONTATIONAL VISUAL FIELD TEST (CVF)
OS_FINDINGS: FULL
OD_FINDINGS: FULL

## 2023-08-08 ASSESSMENT — CORNEAL DYSTROPHY - POSTERIOR
OD_POSTERIORDYSTROPHY: T FUCHS
OS_POSTERIORDYSTROPHY: T FUCHS

## 2023-08-08 ASSESSMENT — REFRACTION_MANIFEST
OS_CYLINDER: -0.25
OS_VA1: 20/20-1
OS_AXIS: 120
OD_AXIS: 75
OD_VA1: 20/30
OD_SPHERE: -3.25
OS_CYLINDER: -0.50
OD_ADD: +2.50
OS_VA1: 20/25
OS_AXIS: 124
OS_ADD: +2.50
OD_SPHERE: -3.25
OS_SPHERE: -3.75
OD_VA1: 20/20
OS_SPHERE: -4.00
OD_CYLINDER: -0.25
OD_CYLINDER: SPH

## 2023-08-08 ASSESSMENT — SPHEQUIV_DERIVED
OS_SPHEQUIV: -6.125
OS_SPHEQUIV: -4
OD_SPHEQUIV: -3.375
OD_SPHEQUIV: -2.875
OS_SPHEQUIV: -4.125

## 2023-08-08 ASSESSMENT — REFRACTION_AUTOREFRACTION
OD_SPHERE: -2.50
OD_CYLINDER: -0.75
OS_AXIS: 102
OD_AXIS: 107
OS_SPHERE: -6.00
OS_CYLINDER: -0.25

## 2023-08-08 ASSESSMENT — KERATOMETRY
OS_AXISANGLE_DEGREES: 170
OS_K1POWER_DIOPTERS: 43.75
OD_K2POWER_DIOPTERS: 44.25
OD_AXISANGLE_DEGREES: 026
OD_K1POWER_DIOPTERS: 43.50
OS_K2POWER_DIOPTERS: 44.00
METHOD_AUTO_MANUAL: AUTO

## 2023-08-08 ASSESSMENT — DECREASING TEAR LAKE - SEVERITY SCORE
OD_DEC_TEARLAKE: 1+
OS_DEC_TEARLAKE: 1+

## 2023-08-08 ASSESSMENT — AXIALLENGTH_DERIVED
OS_AL: 25.1036
OD_AL: 24.8309
OS_AL: 26.0774
OS_AL: 25.1589
OD_AL: 24.617

## 2023-08-08 ASSESSMENT — PACHYMETRY
OD_CT_UM: 609
OD_CT_CORRECTION: -4
OS_CT_UM: 597
OS_CT_CORRECTION: -4

## 2023-08-08 ASSESSMENT — VISUAL ACUITY
OD_BCVA: 20/30-1
OS_BCVA: 20/30

## 2023-08-08 ASSESSMENT — TONOMETRY
OS_IOP_MMHG: 21
OD_IOP_MMHG: 16

## 2023-08-13 ENCOUNTER — NON-APPOINTMENT (OUTPATIENT)
Age: 73
End: 2023-08-13

## 2023-08-17 ENCOUNTER — APPOINTMENT (OUTPATIENT)
Dept: CARDIOLOGY | Facility: CLINIC | Age: 73
End: 2023-08-17
Payer: MEDICARE

## 2023-08-17 VITALS
HEART RATE: 67 BPM | BODY MASS INDEX: 27.64 KG/M2 | DIASTOLIC BLOOD PRESSURE: 72 MMHG | HEIGHT: 66 IN | WEIGHT: 172 LBS | OXYGEN SATURATION: 99 % | SYSTOLIC BLOOD PRESSURE: 116 MMHG

## 2023-08-17 PROCEDURE — 93000 ELECTROCARDIOGRAM COMPLETE: CPT

## 2023-08-17 PROCEDURE — 99214 OFFICE O/P EST MOD 30 MIN: CPT

## 2023-08-17 NOTE — DISCUSSION/SUMMARY
[Hyperlipidemia] : hyperlipidemia [Hypertension] : hypertension [Patient] : the patient [de-identified] : s [FreeTextEntry1] : Pt will continue current medications. CT cor was ordered.to evaluate chest tightness. Pt will follow up in 1 months.  [EKG obtained to assist in diagnosis and management of assessed problem(s)] : EKG obtained to assist in diagnosis and management of assessed problem(s)

## 2023-08-27 LAB
ALBUMIN SERPL ELPH-MCNC: 4.7 G/DL
ALP BLD-CCNC: 58 U/L
ALT SERPL-CCNC: 17 U/L
ANION GAP SERPL CALC-SCNC: 12 MMOL/L
APPEARANCE: CLEAR
AST SERPL-CCNC: 23 U/L
BACTERIA: NEGATIVE /HPF
BILIRUB SERPL-MCNC: 0.7 MG/DL
BILIRUBIN URINE: NEGATIVE
BLOOD URINE: NEGATIVE
BUN SERPL-MCNC: 22 MG/DL
CALCIUM SERPL-MCNC: 9.9 MG/DL
CAST: 0 /LPF
CHLORIDE SERPL-SCNC: 99 MMOL/L
CHOLEST SERPL-MCNC: 183 MG/DL
CO2 SERPL-SCNC: 28 MMOL/L
COLOR: YELLOW
CREAT SERPL-MCNC: 1.17 MG/DL
EGFR: 66 ML/MIN/1.73M2
EPITHELIAL CELLS: 0 /HPF
ESTIMATED AVERAGE GLUCOSE: 114 MG/DL
GLUCOSE QUALITATIVE U: NEGATIVE MG/DL
GLUCOSE SERPL-MCNC: 128 MG/DL
HBA1C MFR BLD HPLC: 5.6 %
HDLC SERPL-MCNC: 67 MG/DL
KETONES URINE: NEGATIVE MG/DL
LDLC SERPL CALC-MCNC: 100 MG/DL
LEUKOCYTE ESTERASE URINE: NEGATIVE
MICROSCOPIC-UA: NORMAL
NITRITE URINE: NEGATIVE
NONHDLC SERPL-MCNC: 116 MG/DL
PH URINE: 6.5
POTASSIUM SERPL-SCNC: 4.3 MMOL/L
PROT SERPL-MCNC: 7.2 G/DL
PROTEIN URINE: NORMAL MG/DL
PSA SERPL-MCNC: <0.01 NG/ML
RED BLOOD CELLS URINE: 0 /HPF
SODIUM SERPL-SCNC: 138 MMOL/L
SPECIFIC GRAVITY URINE: 1.02
T3FREE SERPL-MCNC: 2.82 PG/ML
T4 FREE SERPL-MCNC: 1.2 NG/DL
TRIGL SERPL-MCNC: 92 MG/DL
TSH SERPL-ACNC: 1.19 UIU/ML
UROBILINOGEN URINE: 0.2 MG/DL
WHITE BLOOD CELLS URINE: 0 /HPF

## 2023-08-28 ENCOUNTER — OUTPATIENT (OUTPATIENT)
Dept: OUTPATIENT SERVICES | Facility: HOSPITAL | Age: 73
LOS: 1 days | End: 2023-08-28

## 2023-08-28 DIAGNOSIS — M71.30 OTHER BURSAL CYST, UNSPECIFIED SITE: Chronic | ICD-10-CM

## 2023-08-28 DIAGNOSIS — E78.00 PURE HYPERCHOLESTEROLEMIA, UNSPECIFIED: ICD-10-CM

## 2023-08-28 DIAGNOSIS — I10 ESSENTIAL (PRIMARY) HYPERTENSION: ICD-10-CM

## 2023-08-28 DIAGNOSIS — Z90.79 ACQUIRED ABSENCE OF OTHER GENITAL ORGAN(S): Chronic | ICD-10-CM

## 2023-08-28 DIAGNOSIS — S89.90XA UNSPECIFIED INJURY OF UNSPECIFIED LOWER LEG, INITIAL ENCOUNTER: Chronic | ICD-10-CM

## 2023-08-30 ENCOUNTER — APPOINTMENT (OUTPATIENT)
Dept: CT IMAGING | Facility: CLINIC | Age: 73
End: 2023-08-30

## 2023-09-06 ENCOUNTER — APPOINTMENT (OUTPATIENT)
Dept: FAMILY MEDICINE | Facility: CLINIC | Age: 73
End: 2023-09-06
Payer: MEDICARE

## 2023-09-06 VITALS
HEART RATE: 98 BPM | WEIGHT: 167 LBS | BODY MASS INDEX: 26.84 KG/M2 | HEIGHT: 66 IN | OXYGEN SATURATION: 98 % | SYSTOLIC BLOOD PRESSURE: 132 MMHG | TEMPERATURE: 97.2 F | DIASTOLIC BLOOD PRESSURE: 72 MMHG

## 2023-09-06 DIAGNOSIS — M19.90 UNSPECIFIED OSTEOARTHRITIS, UNSPECIFIED SITE: ICD-10-CM

## 2023-09-06 DIAGNOSIS — C61 MALIGNANT NEOPLASM OF PROSTATE: ICD-10-CM

## 2023-09-06 DIAGNOSIS — Z00.00 ENCOUNTER FOR GENERAL ADULT MEDICAL EXAMINATION W/OUT ABNORMAL FINDINGS: ICD-10-CM

## 2023-09-06 PROCEDURE — G0439: CPT

## 2023-09-06 NOTE — HISTORY OF PRESENT ILLNESS
[FreeTextEntry1] : МАРИНА MELVIN is a 73 year old male here for a physical exam. [de-identified] : His last physical exam was last year  Vaccines: Tetanus is up to date, 2/12/2015 Pneumococcal vaccination is up to date Shingrix is NOT up to date COVID vaccine is up to date  His last dentist visit was less than one year ago His last eye doctor appointment was less than one year ago His last dermatologist visit was less than one year ago  Colon cancer screening is up to date, colonoscopy 5/23/2019  His diet is healthy overall Exercise: kevin

## 2023-09-06 NOTE — PLAN
[FreeTextEntry1] : Continue all medications as prescribed.   Reviewed age-appropriate preventive screening tests with patient. He believes is due for a colonoscopy.  Recommended he get the Shingrix vaccines at the pharmacy since these should be covered as part of the Medicare prescription plan.  Discussed clean eating (eg Mediterranean style eating plan) and regular exercise/staying as physically active as possible.  Include balance exercises and strength training and core strengthening exercises for bone health and to decrease risk for falls.  Reviewed importance of good self care (e.g. meditation, yoga, adequate rest, regular exercise, magnesium, clean eating, etc.).  Follow up for next physical in one year.

## 2023-09-06 NOTE — ASSESSMENT
[FreeTextEntry1] : МАРИНА MELVIN is a 73 year old male here for a physical exam.  Patient has a history of carotid atherosclerosis, arthritis, hypercholesterolemia, hypertension, prostate cancer, and Parkinson's disease.   He sees a cardiologist regularly and does not need an EKG today. He is scheduled for a CT angiogram later this month.  He had labs done prior. His cholesterol is stable on Atorvastatin. His glucose was elevated but his Hgba1c is normal.  His PSA was undetectable.  He has lower extremity edema. He saw a vascular surgeon who recommended a procedure and he is considering this. It seems to be mainly dependent edema caused by venous insufficiency.

## 2023-09-06 NOTE — REVIEW OF SYSTEMS
[Negative] : Heme/Lymph [FreeTextEntry9] : right leg swelling [de-identified] : tick bite [de-identified] : paresthesias of legs and feet

## 2023-09-06 NOTE — HEALTH RISK ASSESSMENT
[No falls in past year] : Patient reported no falls in the past year [0] : 2) Feeling down, depressed, or hopeless: Not at all (0) [PHQ-2 Negative - No further assessment needed] : PHQ-2 Negative - No further assessment needed [Never] : Never [[id number]] : 0

## 2023-09-06 NOTE — PHYSICAL EXAM
[No Acute Distress] : no acute distress [Well Nourished] : well nourished [Well Developed] : well developed [Well-Appearing] : well-appearing [PERRL] : pupils equal round and reactive to light [Normal Sclera/Conjunctiva] : normal sclera/conjunctiva [EOMI] : extraocular movements intact [Normal Outer Ear/Nose] : the outer ears and nose were normal in appearance [Normal Oropharynx] : the oropharynx was normal [No JVD] : no jugular venous distention [Supple] : supple [No Lymphadenopathy] : no lymphadenopathy [Thyroid Normal, No Nodules] : the thyroid was normal and there were no nodules present [No Respiratory Distress] : no respiratory distress  [No Accessory Muscle Use] : no accessory muscle use [Clear to Auscultation] : lungs were clear to auscultation bilaterally [Normal Rate] : normal rate  [Regular Rhythm] : with a regular rhythm [Normal S1, S2] : normal S1 and S2 [No Murmur] : no murmur heard [No Carotid Bruits] : no carotid bruits [No Abdominal Bruit] : a ~M bruit was not heard ~T in the abdomen [No Varicosities] : no varicosities [Pedal Pulses Present] : the pedal pulses are present [No Edema] : there was no peripheral edema [No Palpable Aorta] : no palpable aorta [No Extremity Clubbing/Cyanosis] : no extremity clubbing/cyanosis [Soft] : abdomen soft [Non Tender] : non-tender [Non-distended] : non-distended [No Masses] : no abdominal mass palpated [No HSM] : no HSM [Normal Bowel Sounds] : normal bowel sounds [Normal Posterior Cervical Nodes] : no posterior cervical lymphadenopathy [Normal Anterior Cervical Nodes] : no anterior cervical lymphadenopathy [No CVA Tenderness] : no CVA  tenderness [No Spinal Tenderness] : no spinal tenderness [No Joint Swelling] : no joint swelling [Grossly Normal Strength/Tone] : grossly normal strength/tone [No Rash] : no rash [Coordination Grossly Intact] : coordination grossly intact [No Focal Deficits] : no focal deficits [Normal Gait] : normal gait [Deep Tendon Reflexes (DTR)] : deep tendon reflexes were 2+ and symmetric [Normal Affect] : the affect was normal [Normal Insight/Judgement] : insight and judgment were intact

## 2023-09-14 ENCOUNTER — OFFICE (OUTPATIENT)
Dept: URBAN - METROPOLITAN AREA CLINIC 102 | Facility: CLINIC | Age: 73
Setting detail: OPHTHALMOLOGY
End: 2023-09-14
Payer: MEDICARE

## 2023-09-14 DIAGNOSIS — H25.13: ICD-10-CM

## 2023-09-14 DIAGNOSIS — H25.12: ICD-10-CM

## 2023-09-14 DIAGNOSIS — H35.3132: ICD-10-CM

## 2023-09-14 DIAGNOSIS — H25.89: ICD-10-CM

## 2023-09-14 DIAGNOSIS — H40.013: ICD-10-CM

## 2023-09-14 DIAGNOSIS — H35.373: ICD-10-CM

## 2023-09-14 PROCEDURE — 92136 OPHTHALMIC BIOMETRY: CPT | Performed by: OPHTHALMOLOGY

## 2023-09-14 PROCEDURE — 99213 OFFICE O/P EST LOW 20 MIN: CPT | Performed by: OPHTHALMOLOGY

## 2023-09-14 ASSESSMENT — CORNEAL DYSTROPHY - POSTERIOR
OS_POSTERIORDYSTROPHY: T FUCHS
OD_POSTERIORDYSTROPHY: T FUCHS

## 2023-09-14 ASSESSMENT — REFRACTION_CURRENTRX
OS_AXIS: 117
OS_VPRISM_DIRECTION: PROGS
OS_CYLINDER: -0.50
OS_OVR_VA: 20/
OD_OVR_VA: 20/
OS_VPRISM_DIRECTION: PROGS
OS_CYLINDER: -0.50
OS_AXIS: 117
OD_OVR_VA: 20/
OD_AXIS: 180
OS_AXIS: 078
OD_CYLINDER: SPHERE
OS_SPHERE: -3.75
OD_CYLINDER: -0.50
OS_VPRISM_DIRECTION: SV
OS_SPHERE: -2.75
OD_CYLINDER: 0.00
OS_ADD: +2.25
OD_VPRISM_DIRECTION: SV
OD_ADD: +2.25
OD_AXIS: 0
OD_SPHERE: -4.00
OD_ADD: +2.25
OS_VPRISM_DIRECTION: PROGS
OS_ADD: +2.25
OS_AXIS: 107
OD_SPHERE: -1.50
OD_VPRISM_DIRECTION: PROGS
OS_ADD: +2.50
OD_VPRISM_DIRECTION: SV
OS_SPHERE: -4.00
OS_SPHERE: -3.25
OD_SPHERE: -3.25
OD_OVR_VA: 20/
OD_CYLINDER: -0.75
OS_OVR_VA: 20/
OS_CYLINDER: -0.25
OS_AXIS: 96
OS_SPHERE: -2.25
OD_SPHERE: -3.00
OD_VPRISM_DIRECTION: PROGS
OS_VPRISM_DIRECTION: SV
OD_AXIS: 90
OS_OVR_VA: 20/
OS_CYLINDER: -1.25
OD_AXIS: 089
OD_VPRISM_DIRECTION: PROGS
OD_CYLINDER: -0.25
OD_AXIS: 115
OD_SPHERE: -2.50
OD_ADD: +2.50
OS_CYLINDER: -0.50

## 2023-09-14 ASSESSMENT — VISUAL ACUITY
OD_BCVA: 20/30
OS_BCVA: 20/30-2

## 2023-09-14 ASSESSMENT — KERATOMETRY
OS_AXISANGLE2_DEGREES: 126
OD_AXISANGLE2_DEGREES: 035
METHOD_AUTO_MANUAL: AUTO
OD_CYLAXISANGLE_DEGREES: 035
OS_CYLAXISANGLE_DEGREES: 126
OD_CYLPOWER_DEGREES: 0.25
OS_K2POWER_DIOPTERS: 44.00
OD_K1K2_AVERAGE: 43.875
OS_K1POWER_DIOPTERS: 43.75
OS_CYLPOWER_DEGREES: 0.25
OS_K2POWER_DIOPTERS: 44.00
OD_AXISANGLE_DEGREES: 125
OD_K1POWER_DIOPTERS: 43.75
OD_K2POWER_DIOPTERS: 44.00
OS_AXISANGLE_DEGREES: 126
OD_K2POWER_DIOPTERS: 44.00
OD_K1POWER_DIOPTERS: 43.75
OS_K1POWER_DIOPTERS: 43.75
OD_AXISANGLE_DEGREES: 035
OS_AXISANGLE_DEGREES: 36
OS_K1K2_AVERAGE: 43.875

## 2023-09-14 ASSESSMENT — REFRACTION_MANIFEST
OD_SPHERE: -3.25
OS_CYLINDER: -0.25
OS_VA1: 20/20-1
OD_CYLINDER: -0.25
OS_ADD: +2.50
OD_VA1: 20/30
OD_ADD: +2.50
OS_VA1: 20/25
OD_CYLINDER: SPH
OS_AXIS: 124
OS_CYLINDER: -0.50
OS_SPHERE: -3.75
OS_AXIS: 120
OD_AXIS: 75
OD_VA1: 20/20
OD_SPHERE: -3.25
OS_SPHERE: -4.00

## 2023-09-14 ASSESSMENT — REFRACTION_AUTOREFRACTION
OS_CYLINDER: -0.75
OD_AXIS: 129
OS_AXIS: 092
OD_SPHERE: -3.00
OD_CYLINDER: -0.75
OS_SPHERE: -6.00

## 2023-09-14 ASSESSMENT — PACHYMETRY
OD_CT_CORRECTION: -4
OS_CT_UM: 597
OS_CT_CORRECTION: -4
OD_CT_UM: 609

## 2023-09-14 ASSESSMENT — AXIALLENGTH_DERIVED
OS_AL: 25.1589
OD_AL: 24.8309
OS_AL: 25.1036
OS_AL: 26.1969
OD_AL: 24.8309

## 2023-09-14 ASSESSMENT — CONFRONTATIONAL VISUAL FIELD TEST (CVF)
OS_FINDINGS: FULL
OD_FINDINGS: FULL

## 2023-09-14 ASSESSMENT — SPHEQUIV_DERIVED
OD_SPHEQUIV: -3.375
OD_SPHEQUIV: -3.375
OS_SPHEQUIV: -4.125
OS_SPHEQUIV: -4
OS_SPHEQUIV: -6.375

## 2023-09-14 ASSESSMENT — DECREASING TEAR LAKE - SEVERITY SCORE
OD_DEC_TEARLAKE: 1+
OS_DEC_TEARLAKE: 1+

## 2023-09-20 ENCOUNTER — APPOINTMENT (OUTPATIENT)
Dept: CT IMAGING | Facility: CLINIC | Age: 73
End: 2023-09-20
Payer: MEDICARE

## 2023-09-20 PROCEDURE — 75574 CT ANGIO HRT W/3D IMAGE: CPT

## 2023-09-22 ENCOUNTER — APPOINTMENT (OUTPATIENT)
Dept: CARDIOLOGY | Facility: CLINIC | Age: 73
End: 2023-09-22
Payer: MEDICARE

## 2023-09-22 VITALS
BODY MASS INDEX: 27.76 KG/M2 | HEART RATE: 80 BPM | DIASTOLIC BLOOD PRESSURE: 70 MMHG | SYSTOLIC BLOOD PRESSURE: 110 MMHG | WEIGHT: 172 LBS | OXYGEN SATURATION: 98 %

## 2023-09-22 PROCEDURE — 93000 ELECTROCARDIOGRAM COMPLETE: CPT

## 2023-09-22 PROCEDURE — 99215 OFFICE O/P EST HI 40 MIN: CPT

## 2023-10-13 ENCOUNTER — APPOINTMENT (OUTPATIENT)
Dept: CARDIOLOGY | Facility: CLINIC | Age: 73
End: 2023-10-13
Payer: MEDICARE

## 2023-10-13 PROCEDURE — 93306 TTE W/DOPPLER COMPLETE: CPT

## 2023-10-14 LAB
ALBUMIN SERPL ELPH-MCNC: 4.6 G/DL
ALP BLD-CCNC: 54 U/L
ALT SERPL-CCNC: 14 U/L
ANION GAP SERPL CALC-SCNC: 14 MMOL/L
AST SERPL-CCNC: 23 U/L
BASOPHILS # BLD AUTO: 0.11 K/UL
BASOPHILS NFR BLD AUTO: 1.3 %
BILIRUB SERPL-MCNC: 1 MG/DL
BUN SERPL-MCNC: 21 MG/DL
CALCIUM SERPL-MCNC: 10.1 MG/DL
CHLORIDE SERPL-SCNC: 100 MMOL/L
CO2 SERPL-SCNC: 25 MMOL/L
CREAT SERPL-MCNC: 1.41 MG/DL
EGFR: 53 ML/MIN/1.73M2
EOSINOPHIL # BLD AUTO: 0.14 K/UL
EOSINOPHIL NFR BLD AUTO: 1.6 %
ESTIMATED AVERAGE GLUCOSE: 108 MG/DL
GLUCOSE SERPL-MCNC: 75 MG/DL
HBA1C MFR BLD HPLC: 5.4 %
HCT VFR BLD CALC: 42.6 %
HGB BLD-MCNC: 14 G/DL
IMM GRANULOCYTES NFR BLD AUTO: 0.2 %
INR PPP: 1.02 RATIO
LYMPHOCYTES # BLD AUTO: 2.85 K/UL
LYMPHOCYTES NFR BLD AUTO: 33.5 %
MAN DIFF?: NORMAL
MCHC RBC-ENTMCNC: 29.3 PG
MCHC RBC-ENTMCNC: 32.9 GM/DL
MCV RBC AUTO: 89.1 FL
MONOCYTES # BLD AUTO: 0.74 K/UL
MONOCYTES NFR BLD AUTO: 8.7 %
NEUTROPHILS # BLD AUTO: 4.64 K/UL
NEUTROPHILS NFR BLD AUTO: 54.7 %
PLATELET # BLD AUTO: 279 K/UL
POTASSIUM SERPL-SCNC: 4.3 MMOL/L
PROT SERPL-MCNC: 7.5 G/DL
PT BLD: 11.5 SEC
RBC # BLD: 4.78 M/UL
RBC # FLD: 13.4 %
SODIUM SERPL-SCNC: 140 MMOL/L
TSH SERPL-ACNC: 1.79 UIU/ML
WBC # FLD AUTO: 8.5 K/UL

## 2023-10-17 RX ORDER — HYDROCHLOROTHIAZIDE 25 MG
1 TABLET ORAL
Refills: 0 | DISCHARGE

## 2023-10-17 RX ORDER — RASAGILINE 0.5 MG/1
1 TABLET ORAL
Qty: 0 | Refills: 0 | DISCHARGE

## 2023-10-17 RX ORDER — OMEPRAZOLE 10 MG/1
1 CAPSULE, DELAYED RELEASE ORAL
Qty: 0 | Refills: 0 | DISCHARGE

## 2023-10-17 RX ORDER — LOSARTAN POTASSIUM 100 MG/1
1 TABLET, FILM COATED ORAL
Refills: 0 | DISCHARGE

## 2023-10-17 RX ORDER — VARDENAFIL HYDROCHLORIDE 2.5 MG/1
1 TABLET ORAL
Qty: 0 | Refills: 0 | DISCHARGE

## 2023-10-17 RX ORDER — ALIROCUMAB 75 MG/ML
1 INJECTION, SOLUTION SUBCUTANEOUS
Qty: 0 | Refills: 0 | DISCHARGE

## 2023-10-17 RX ORDER — ROPINIROLE 8 MG/1
3 TABLET, FILM COATED, EXTENDED RELEASE ORAL
Qty: 0 | Refills: 0 | DISCHARGE

## 2023-10-17 RX ORDER — FAMOTIDINE 10 MG/ML
1 INJECTION INTRAVENOUS
Refills: 0 | DISCHARGE

## 2023-10-17 RX ORDER — ASPIRIN/CALCIUM CARB/MAGNESIUM 324 MG
1 TABLET ORAL
Qty: 0 | Refills: 0 | DISCHARGE

## 2023-10-17 NOTE — H&P ADULT - ASSESSMENT
73yr old male PMH HTN, HLD, ASCVD, aortic, carotid and peripheral vascular disease with c/o mild dyspnea and chest tightness at rest pt. had a CT Angio which revealed a calcium score of 811 heavily calcified plaque in the proximal LAD and in the diagonal branch. Pt. now referred for [facility] for further evaluation      ASA class:  Creatinine:  GFR:  Bleeding  Risk score:  Damián Score:  73yr old male PMH HTN, HLD, ASCVD, aortic, carotid and peripheral vascular disease with c/o mild dyspnea and chest tightness at rest pt. had a CT Angio which revealed a calcium score of 811 heavily calcified plaque in the proximal LAD and in the diagonal branch. Pt. now referred for [facility] for further evaluation      ASA class:II  Creatinine:  GFR:  Bleeding  Risk score:  Damián Score: 4 73yr old male PMH HTN, HLD, ASCVD, aortic, carotid and peripheral vascular disease with c/o mild dyspnea and chest tightness at rest pt. had a CT Angio which revealed a calcium score of 811 heavily calcified plaque in the proximal LAD and in the diagonal branch. Pt. now referred for [facility] for further evaluation      ASA class:II  Creatinine: 1.4   GFR: 64  Bleeding  Risk score: 1.4  Damián Score: 4

## 2023-10-17 NOTE — ASU PATIENT PROFILE, ADULT - FALL HARM RISK - UNIVERSAL INTERVENTIONS
Bed in lowest position, wheels locked, appropriate side rails in place/Call bell, personal items and telephone in reach/Instruct patient to call for assistance before getting out of bed or chair/Non-slip footwear when patient is out of bed/[place] to call system/Physically safe environment - no spills, clutter or unnecessary equipment/Purposeful Proactive Rounding/Room/bathroom lighting operational, light cord in reach

## 2023-10-17 NOTE — H&P ADULT - NSHPLABSRESULTS_GEN_ALL_CORE
9/20/23 CTA of heart coronary: Coronary calcium score: 811, heavily calcified pLAD likely causing moderate to severe luminal narrowing (60-70%  8/18/20  Echo EF 30%  10/18/23 EKG: 10/13/23 EF 58%  9/20/23 CTA of heart coronary: Coronary calcium score: 811, heavily calcified pLAD likely causing moderate to severe luminal narrowing (60-70%  8/18/20  Echo EF 30%    10/18/23 EKG:  SR PAC

## 2023-10-17 NOTE — H&P ADULT - PROBLEM SELECTOR PLAN 1
a/w elevated Ca score 811 and STEVEN   -plan for cardiac cath for ischemic work up  - UNA protocol pre hydration: NS 250cc IV bolus x1   -Consent obtained for cardiac catheterization w/ coronary angiogram and possible stent placement, with possible sedation and analgia. Pt is competent, has capacity, and understands risks and benefits of procedure. Risks and benefits discussed. Risk discussed included, but not limited to MI, stroke, mortality, major bleeding, arrythmia, or infection. All questions answered

## 2023-10-17 NOTE — ASU PATIENT PROFILE, ADULT - FALL HARM RISK - CONCLUSION
Pt called stating that she saw her labs results on the patient portal and has a few questions.    Advise pt that once dr reviews results we will reach out to pt and go over results and her questions.    Please advise    Universal Safety Interventions

## 2023-10-17 NOTE — H&P ADULT - HISTORY OF PRESENT ILLNESS
73yr old male PMH HTN, HLD, ASCVD, aortic, carotid and peripheral vascular disease with c/o mild dyspnea and chest tightness at rest pt. had a CT Angio which revealed a calcium score of 811 heavily calcified plaque in the proximal LAD and in the diagonal branch. Pt. now referred for [facility] for further evaluation   73yr old male PMH HTN, HLD, ASCVD, aortic, carotid and peripheral vascular disease, parkinsons with c/o mild dyspnea and chest tightness at rest pt. had a CT Angio which revealed a calcium score of 811 heavily calcified plaque in the proximal LAD and in the diagonal branch. Pt. now referred for [facility] for further evaluation

## 2023-10-17 NOTE — H&P ADULT - NSICDXPASTSURGICALHX_GEN_ALL_CORE_FT
PAST SURGICAL HISTORY:  H/O radical prostatectomy 2003    Knee injury Arthroscopy 1994 due to soccer injury    Synovial cyst removed from back and LEFT HAND     PAST SURGICAL HISTORY:  H/O radical prostatectomy 2003    H/O shoulder surgery     Knee injury Arthroscopy 1994 due to soccer injury    Synovial cyst removed from back and LEFT HAND

## 2023-10-17 NOTE — H&P ADULT - NSICDXPASTMEDICALHX_GEN_ALL_CORE_FT
PAST MEDICAL HISTORY:  Cataracts, bilateral     HLD (hyperlipidemia)     Parkinson disease      PAST MEDICAL HISTORY:  Cataracts, bilateral     HLD (hyperlipidemia)     HTN (hypertension)     Parkinson disease

## 2023-10-18 ENCOUNTER — TRANSCRIPTION ENCOUNTER (OUTPATIENT)
Age: 73
End: 2023-10-18

## 2023-10-18 ENCOUNTER — OUTPATIENT (OUTPATIENT)
Dept: OUTPATIENT SERVICES | Facility: HOSPITAL | Age: 73
LOS: 1 days | Discharge: HOME | End: 2023-10-18
Payer: MEDICARE

## 2023-10-18 VITALS
RESPIRATION RATE: 16 BRPM | WEIGHT: 164.91 LBS | HEART RATE: 83 BPM | SYSTOLIC BLOOD PRESSURE: 127 MMHG | DIASTOLIC BLOOD PRESSURE: 69 MMHG | OXYGEN SATURATION: 100 % | TEMPERATURE: 98 F

## 2023-10-18 VITALS
OXYGEN SATURATION: 96 % | SYSTOLIC BLOOD PRESSURE: 102 MMHG | DIASTOLIC BLOOD PRESSURE: 58 MMHG | HEART RATE: 67 BPM | TEMPERATURE: 98 F | RESPIRATION RATE: 17 BRPM

## 2023-10-18 DIAGNOSIS — R94.39 ABNORMAL RESULT OF OTHER CARDIOVASCULAR FUNCTION STUDY: ICD-10-CM

## 2023-10-18 DIAGNOSIS — Z90.79 ACQUIRED ABSENCE OF OTHER GENITAL ORGAN(S): Chronic | ICD-10-CM

## 2023-10-18 DIAGNOSIS — R93.1 ABNORMAL FINDINGS ON DIAGNOSTIC IMAGING OF HEART AND CORONARY CIRCULATION: ICD-10-CM

## 2023-10-18 DIAGNOSIS — S89.90XA UNSPECIFIED INJURY OF UNSPECIFIED LOWER LEG, INITIAL ENCOUNTER: Chronic | ICD-10-CM

## 2023-10-18 DIAGNOSIS — Z98.890 OTHER SPECIFIED POSTPROCEDURAL STATES: Chronic | ICD-10-CM

## 2023-10-18 DIAGNOSIS — M71.30 OTHER BURSAL CYST, UNSPECIFIED SITE: Chronic | ICD-10-CM

## 2023-10-18 LAB
ABO RH CONFIRMATION: SIGNIFICANT CHANGE UP
ABO RH CONFIRMATION: SIGNIFICANT CHANGE UP
ANION GAP SERPL CALC-SCNC: 8 MMOL/L — SIGNIFICANT CHANGE UP (ref 5–17)
ANION GAP SERPL CALC-SCNC: 8 MMOL/L — SIGNIFICANT CHANGE UP (ref 5–17)
BLD GP AB SCN SERPL QL: SIGNIFICANT CHANGE UP
BLD GP AB SCN SERPL QL: SIGNIFICANT CHANGE UP
BUN SERPL-MCNC: 25 MG/DL — HIGH (ref 7–23)
BUN SERPL-MCNC: 25 MG/DL — HIGH (ref 7–23)
CALCIUM SERPL-MCNC: 9.3 MG/DL — SIGNIFICANT CHANGE UP (ref 8.5–10.1)
CALCIUM SERPL-MCNC: 9.3 MG/DL — SIGNIFICANT CHANGE UP (ref 8.5–10.1)
CHLORIDE SERPL-SCNC: 102 MMOL/L — SIGNIFICANT CHANGE UP (ref 96–108)
CHLORIDE SERPL-SCNC: 102 MMOL/L — SIGNIFICANT CHANGE UP (ref 96–108)
CO2 SERPL-SCNC: 29 MMOL/L — SIGNIFICANT CHANGE UP (ref 22–31)
CO2 SERPL-SCNC: 29 MMOL/L — SIGNIFICANT CHANGE UP (ref 22–31)
CREAT SERPL-MCNC: 1.31 MG/DL — HIGH (ref 0.5–1.3)
CREAT SERPL-MCNC: 1.31 MG/DL — HIGH (ref 0.5–1.3)
EGFR: 57 ML/MIN/1.73M2 — LOW
EGFR: 57 ML/MIN/1.73M2 — LOW
GLUCOSE SERPL-MCNC: 89 MG/DL — SIGNIFICANT CHANGE UP (ref 70–99)
GLUCOSE SERPL-MCNC: 89 MG/DL — SIGNIFICANT CHANGE UP (ref 70–99)
HCT VFR BLD CALC: 40.2 % — SIGNIFICANT CHANGE UP (ref 39–50)
HCT VFR BLD CALC: 40.2 % — SIGNIFICANT CHANGE UP (ref 39–50)
HGB BLD-MCNC: 13.6 G/DL — SIGNIFICANT CHANGE UP (ref 13–17)
HGB BLD-MCNC: 13.6 G/DL — SIGNIFICANT CHANGE UP (ref 13–17)
MCHC RBC-ENTMCNC: 29.8 PG — SIGNIFICANT CHANGE UP (ref 27–34)
MCHC RBC-ENTMCNC: 29.8 PG — SIGNIFICANT CHANGE UP (ref 27–34)
MCHC RBC-ENTMCNC: 33.8 GM/DL — SIGNIFICANT CHANGE UP (ref 32–36)
MCHC RBC-ENTMCNC: 33.8 GM/DL — SIGNIFICANT CHANGE UP (ref 32–36)
MCV RBC AUTO: 88 FL — SIGNIFICANT CHANGE UP (ref 80–100)
MCV RBC AUTO: 88 FL — SIGNIFICANT CHANGE UP (ref 80–100)
PLATELET # BLD AUTO: 246 K/UL — SIGNIFICANT CHANGE UP (ref 150–400)
PLATELET # BLD AUTO: 246 K/UL — SIGNIFICANT CHANGE UP (ref 150–400)
POTASSIUM SERPL-MCNC: 3.6 MMOL/L — SIGNIFICANT CHANGE UP (ref 3.5–5.3)
POTASSIUM SERPL-MCNC: 3.6 MMOL/L — SIGNIFICANT CHANGE UP (ref 3.5–5.3)
POTASSIUM SERPL-SCNC: 3.6 MMOL/L — SIGNIFICANT CHANGE UP (ref 3.5–5.3)
POTASSIUM SERPL-SCNC: 3.6 MMOL/L — SIGNIFICANT CHANGE UP (ref 3.5–5.3)
RBC # BLD: 4.57 M/UL — SIGNIFICANT CHANGE UP (ref 4.2–5.8)
RBC # BLD: 4.57 M/UL — SIGNIFICANT CHANGE UP (ref 4.2–5.8)
RBC # FLD: 12.9 % — SIGNIFICANT CHANGE UP (ref 10.3–14.5)
RBC # FLD: 12.9 % — SIGNIFICANT CHANGE UP (ref 10.3–14.5)
SODIUM SERPL-SCNC: 139 MMOL/L — SIGNIFICANT CHANGE UP (ref 135–145)
SODIUM SERPL-SCNC: 139 MMOL/L — SIGNIFICANT CHANGE UP (ref 135–145)
WBC # BLD: 6.81 K/UL — SIGNIFICANT CHANGE UP (ref 3.8–10.5)
WBC # BLD: 6.81 K/UL — SIGNIFICANT CHANGE UP (ref 3.8–10.5)
WBC # FLD AUTO: 6.81 K/UL — SIGNIFICANT CHANGE UP (ref 3.8–10.5)
WBC # FLD AUTO: 6.81 K/UL — SIGNIFICANT CHANGE UP (ref 3.8–10.5)

## 2023-10-18 PROCEDURE — 99152 MOD SED SAME PHYS/QHP 5/>YRS: CPT

## 2023-10-18 PROCEDURE — 86901 BLOOD TYPING SEROLOGIC RH(D): CPT

## 2023-10-18 PROCEDURE — 86850 RBC ANTIBODY SCREEN: CPT

## 2023-10-18 PROCEDURE — C1769: CPT

## 2023-10-18 PROCEDURE — 36415 COLL VENOUS BLD VENIPUNCTURE: CPT

## 2023-10-18 PROCEDURE — C9600: CPT | Mod: LD

## 2023-10-18 PROCEDURE — 92978 ENDOLUMINL IVUS OCT C 1ST: CPT | Mod: 26,LD

## 2023-10-18 PROCEDURE — C1887: CPT

## 2023-10-18 PROCEDURE — 86870 RBC ANTIBODY IDENTIFICATION: CPT

## 2023-10-18 PROCEDURE — 86900 BLOOD TYPING SEROLOGIC ABO: CPT

## 2023-10-18 PROCEDURE — 92928 PRQ TCAT PLMT NTRAC ST 1 LES: CPT | Mod: LD

## 2023-10-18 PROCEDURE — C1874: CPT

## 2023-10-18 PROCEDURE — 93005 ELECTROCARDIOGRAM TRACING: CPT

## 2023-10-18 PROCEDURE — C1725: CPT

## 2023-10-18 PROCEDURE — 93458 L HRT ARTERY/VENTRICLE ANGIO: CPT | Mod: 26,59

## 2023-10-18 PROCEDURE — C1753: CPT

## 2023-10-18 PROCEDURE — 80048 BASIC METABOLIC PNL TOTAL CA: CPT

## 2023-10-18 PROCEDURE — C1894: CPT

## 2023-10-18 PROCEDURE — 86880 COOMBS TEST DIRECT: CPT

## 2023-10-18 PROCEDURE — 92978 ENDOLUMINL IVUS OCT C 1ST: CPT | Mod: LD

## 2023-10-18 PROCEDURE — 93010 ELECTROCARDIOGRAM REPORT: CPT

## 2023-10-18 PROCEDURE — 85027 COMPLETE CBC AUTOMATED: CPT

## 2023-10-18 RX ORDER — SODIUM CHLORIDE 9 MG/ML
250 INJECTION INTRAMUSCULAR; INTRAVENOUS; SUBCUTANEOUS ONCE
Refills: 0 | Status: COMPLETED | OUTPATIENT
Start: 2023-10-18 | End: 2023-10-18

## 2023-10-18 RX ORDER — CLOPIDOGREL BISULFATE 75 MG/1
1 TABLET, FILM COATED ORAL
Qty: 90 | Refills: 4
Start: 2023-10-18 | End: 2025-01-09

## 2023-10-18 RX ORDER — RASAGILINE 0.5 MG/1
1 TABLET ORAL DAILY
Refills: 0 | Status: DISCONTINUED | OUTPATIENT
Start: 2023-10-18 | End: 2023-10-18

## 2023-10-18 RX ORDER — ASPIRIN/CALCIUM CARB/MAGNESIUM 324 MG
81 TABLET ORAL DAILY
Refills: 0 | Status: DISCONTINUED | OUTPATIENT
Start: 2023-10-18 | End: 2023-10-18

## 2023-10-18 RX ORDER — FAMOTIDINE 10 MG/ML
40 INJECTION INTRAVENOUS DAILY
Refills: 0 | Status: DISCONTINUED | OUTPATIENT
Start: 2023-10-18 | End: 2023-10-18

## 2023-10-18 RX ORDER — CLOPIDOGREL BISULFATE 75 MG/1
75 TABLET, FILM COATED ORAL DAILY
Refills: 0 | Status: DISCONTINUED | OUTPATIENT
Start: 2023-10-19 | End: 2023-10-18

## 2023-10-18 RX ORDER — ROPINIROLE 8 MG/1
4 TABLET, FILM COATED, EXTENDED RELEASE ORAL THREE TIMES A DAY
Refills: 0 | Status: DISCONTINUED | OUTPATIENT
Start: 2023-10-18 | End: 2023-10-18

## 2023-10-18 RX ORDER — LOSARTAN POTASSIUM 100 MG/1
25 TABLET, FILM COATED ORAL DAILY
Refills: 0 | Status: DISCONTINUED | OUTPATIENT
Start: 2023-10-18 | End: 2023-10-18

## 2023-10-18 RX ORDER — SODIUM CHLORIDE 9 MG/ML
1000 INJECTION INTRAMUSCULAR; INTRAVENOUS; SUBCUTANEOUS
Refills: 0 | Status: DISCONTINUED | OUTPATIENT
Start: 2023-10-18 | End: 2023-10-18

## 2023-10-18 RX ADMIN — SODIUM CHLORIDE 250 MILLILITER(S): 9 INJECTION INTRAMUSCULAR; INTRAVENOUS; SUBCUTANEOUS at 14:10

## 2023-10-18 RX ADMIN — SODIUM CHLORIDE 150 MILLILITER(S): 9 INJECTION INTRAMUSCULAR; INTRAVENOUS; SUBCUTANEOUS at 16:00

## 2023-10-18 NOTE — DISCHARGE NOTE NURSING/CASE MANAGEMENT/SOCIAL WORK - PATIENT PORTAL LINK FT
You can access the FollowMyHealth Patient Portal offered by [facility] by registering at the following website: [email or web address]. By joining [employer]’s FollowMyHealth portal, you will also be able to view your health information using other applications (apps) compatible with our system.

## 2023-10-18 NOTE — PROGRESS NOTE ADULT - SUBJECTIVE AND OBJECTIVE BOX
Department of Cardiology                                                               Division of Interventional Cardiology                                                               [facility] /[address] [address]                                                                                 [phone]               HPI:  73yr old male PMH HTN, HLD, ASCVD, aortic, carotid and peripheral vascular disease, parkinsons with c/o mild dyspnea and chest tightness at rest pt. had a CT Angio which revealed a calcium score of 811 heavily calcified plaque in the proximal LAD and in the diagonal branch. Pt. now referred for [facility] for further evaluation   (17 Oct 2023 14:12)    Vital Signs  Vital Signs Last 24 Hrs  T(C): 36.8 (18 Oct 2023 13:45), Max: 36.8 (18 Oct 2023 13:45)  T(F): 98.2 (18 Oct 2023 13:45), Max: 98.2 (18 Oct 2023 13:45)  HR: 69 (18 Oct 2023 16:00) (69 - 83)  BP: 108/70 (18 Oct 2023 16:00) (108/70 - 127/69)  BP(mean): --  RR: 18 (18 Oct 2023 16:00) (16 - 18)  SpO2: 100% (18 Oct 2023 16:00) (100% - 100%)    Parameters below as of 18 Oct 2023 16:00  Patient On (Oxygen Delivery Method): room air          Labs:                        13.6   6.81  )-----------( 246      ( 18 Oct 2023 14:00 )             40.2     10-18    139  |  102  |  25<H>  ----------------------------<  89  3.6   |  29  |  1.31<H>    Ca    9.3      18 Oct 2023 14:00              MEDICATIONS  (STANDING):  sodium chloride 0.9%. 1000 milliLiter(s) (150 mL/Hr) IV Continuous <Continuous>      PHYSICAL EXAM  GENERAL: NAD, AAOx3  CHEST/LUNG: Clear to auscultation bilaterally; No wheeze  HEART: s1 s2 Regular rate and rhythm; No murmurs, rubs, or gallops  ABDOMEN: Soft, Nontender, Nondistended; Bowel sounds present X 4 quadrants   EXTREMITIES:  2+ Peripheral Pulses, No clubbing, cyanosis, or edema  Psych: normal affect and behavior, calm and cooperative   PROCEDURE SITE: ----- ,Site is without hematoma or bleeding. Sensation and NAMAN intact. Distal pulses palpable 2+, capillary refill < 2 seconds. Patient denies pain, numbness, tingling, CP or SOB.      EKG post PCI NSR     PROCEDURE RESULTS  full report to follow

## 2023-10-18 NOTE — PROGRESS NOTE ADULT - ASSESSMENT
s/p LHC :  Pt denies chest pain/SOB/palpitations post cath.      #CAD  - s/p CHANDLER to Diag  -VS, diet, activity as per post cath orders  - IVF per UNA protocol   -Encourage PO fluids  -Continue current medications  -Post cath instructions reviewed, post sedation instructions reviewed; patient verbalizes and understands instructions  -Discussed therapeutic lifestyle changes to reduce risk factors such as following a cardiac diet, weight loss, maintaining a healthy weight, exercise, smoking cessation, medication compliance, and regular follow-up  with PCP/Cardioloigst  - stent card given to patient/family   -Qualified for cardiac rehab. Patient educated on cardiac rehab. Information sheet provided and left with patient- referral [facility]   - Patient to be discharged home at 930pm , recommend following up with cardiologist in 2 weeks  -Plan of care discussed with patient, RN and Dr. Hatch

## 2023-10-18 NOTE — PACU DISCHARGE NOTE - COMMENTS
pt is s/p LHC via RRA. pt is aaox4, denies pain, discomfort, palpitations, SOB, dizziness. RUE is warm and mobile w no s/sx bleeding or hematoma. discharge instructions reviewed w pt including medication reconciliation, follow up appts and post catheterization precautions. pt verbalizes understanding of information and provides teachback. all questions answered to pt satisfaction. Report is given to Martha PERSON 3E for continuity of care pending 2130 discharge. pt is pending transport to [facility] w [facility] monitor in place.

## 2023-10-18 NOTE — DISCHARGE NOTE NURSING/CASE MANAGEMENT/SOCIAL WORK - NSDCPEFALRISK_GEN_ALL_CORE
For information on Fall & Injury Prevention, visit: [email or web address] OR  [email or web address] OR  https://www.cdc.gov/steadi/patient.html

## 2023-10-19 ENCOUNTER — RX RENEWAL (OUTPATIENT)
Age: 73
End: 2023-10-19

## 2023-10-19 PROBLEM — I10 ESSENTIAL (PRIMARY) HYPERTENSION: Chronic | Status: ACTIVE | Noted: 2023-10-18

## 2023-10-20 NOTE — POST DISCHARGE NOTE - DETAILS:
Post procedure phone call completed; patient understood all discharge paperwork. No questions regarding medications or pain management. MD follow up appointment made. Patient was able to rest when they were discharged. Patient will recommend [facility], no complaints of hospital stay, satisfied with care. Instructed patient to contact provider with any further questions or concerns.

## 2023-10-23 DIAGNOSIS — I25.118 ATHEROSCLEROTIC HEART DISEASE OF NATIVE CORONARY ARTERY WITH OTHER FORMS OF ANGINA PECTORIS: ICD-10-CM

## 2023-10-23 DIAGNOSIS — R94.39 ABNORMAL RESULT OF OTHER CARDIOVASCULAR FUNCTION STUDY: ICD-10-CM

## 2023-10-23 DIAGNOSIS — I10 ESSENTIAL (PRIMARY) HYPERTENSION: ICD-10-CM

## 2023-10-30 ENCOUNTER — APPOINTMENT (OUTPATIENT)
Dept: CARDIOLOGY | Facility: CLINIC | Age: 73
End: 2023-10-30
Payer: MEDICARE

## 2023-10-30 ENCOUNTER — NON-APPOINTMENT (OUTPATIENT)
Age: 73
End: 2023-10-30

## 2023-10-30 VITALS
DIASTOLIC BLOOD PRESSURE: 62 MMHG | HEIGHT: 66 IN | SYSTOLIC BLOOD PRESSURE: 110 MMHG | HEART RATE: 77 BPM | OXYGEN SATURATION: 99 %

## 2023-10-30 PROCEDURE — 99214 OFFICE O/P EST MOD 30 MIN: CPT

## 2023-10-30 PROCEDURE — 93000 ELECTROCARDIOGRAM COMPLETE: CPT

## 2023-10-30 RX ORDER — AMOXICILLIN 500 MG/1
500 TABLET, FILM COATED ORAL
Qty: 20 | Refills: 3 | Status: DISCONTINUED | COMMUNITY
Start: 2023-05-19 | End: 2023-10-30

## 2023-11-01 RX ORDER — ALIROCUMAB 75 MG/ML
75 INJECTION, SOLUTION SUBCUTANEOUS
Refills: 0 | DISCHARGE

## 2023-11-01 NOTE — CHART NOTE - NSCHARTNOTEFT_GEN_A_CORE
patient remains stable post [facility]  Discussed therapeutic lifestyle changes to reduce risk factors such as following a cardiac diet, weight loss, maintaining a healthy weight, exercise, smoking cessation, medication compliance, and regular follow-up  with PCP/Cardioloigst   stent card given to family already    patient cleared for d/c home at 930pm    can call MD/NP  if issues arise    Sharri HYDEPC-AG, AGPCNP-C  [phone] work cell
Cardiac rehab Referral     МАРИНА MELVIN 73yM  MRN: [id number]  : 50  Admit: 10-18-23  Patient is a 73y old  Male who presents with a chief complaint of LHC (18 Oct 2023 16:05)      pt s/p [facility] with CHANDLER to  [facility]     -Qualified for cardiac rehab. Patient educated on cardiac rehab. Information sheet provided and left with patient; referral sent to [facility] "[employer]
Patient to stay on Praluent 75mg Inj for HLD, not on statins 2/2 intolerance

## 2023-11-07 ENCOUNTER — APPOINTMENT (OUTPATIENT)
Dept: GASTROENTEROLOGY | Facility: CLINIC | Age: 73
End: 2023-11-07
Payer: MEDICARE

## 2023-11-07 VITALS
WEIGHT: 168 LBS | HEIGHT: 66 IN | DIASTOLIC BLOOD PRESSURE: 70 MMHG | BODY MASS INDEX: 27 KG/M2 | SYSTOLIC BLOOD PRESSURE: 138 MMHG

## 2023-11-07 DIAGNOSIS — Z12.11 ENCOUNTER FOR SCREENING FOR MALIGNANT NEOPLASM OF COLON: ICD-10-CM

## 2023-11-07 DIAGNOSIS — Z80.0 ENCOUNTER FOR SCREENING FOR MALIGNANT NEOPLASM OF COLON: ICD-10-CM

## 2023-11-07 PROCEDURE — 99203 OFFICE O/P NEW LOW 30 MIN: CPT

## 2023-11-10 ENCOUNTER — APPOINTMENT (OUTPATIENT)
Dept: NEUROLOGY | Facility: CLINIC | Age: 73
End: 2023-11-10

## 2023-11-29 ENCOUNTER — APPOINTMENT (OUTPATIENT)
Dept: NEUROLOGY | Facility: CLINIC | Age: 73
End: 2023-11-29
Payer: MEDICARE

## 2023-11-29 VITALS
DIASTOLIC BLOOD PRESSURE: 82 MMHG | HEART RATE: 84 BPM | WEIGHT: 168 LBS | SYSTOLIC BLOOD PRESSURE: 148 MMHG | BODY MASS INDEX: 27 KG/M2 | TEMPERATURE: 98.2 F | HEIGHT: 66 IN

## 2023-11-29 PROCEDURE — 99213 OFFICE O/P EST LOW 20 MIN: CPT

## 2023-11-29 NOTE — POST DISCHARGE NOTE - RECOMMENDATION:
30 day post procedure phone call completed;  No questions regarding medications or pain management. Pt does complain of occasional constipation and will speak with the NP today at cardiac rehab for over the counter remedies. Continues to follow up with MD. Patient will continue to recommend [facility], no complaints of hospital stay, satisfied with care. Instructed patient to contact provider with any further questions or concerns.

## 2024-01-16 ENCOUNTER — APPOINTMENT (OUTPATIENT)
Dept: CARDIOLOGY | Facility: CLINIC | Age: 74
End: 2024-01-16
Payer: MEDICARE

## 2024-01-16 ENCOUNTER — NON-APPOINTMENT (OUTPATIENT)
Age: 74
End: 2024-01-16

## 2024-01-16 VITALS
OXYGEN SATURATION: 98 % | SYSTOLIC BLOOD PRESSURE: 112 MMHG | HEART RATE: 73 BPM | WEIGHT: 176 LBS | DIASTOLIC BLOOD PRESSURE: 62 MMHG | BODY MASS INDEX: 28.28 KG/M2 | HEIGHT: 66 IN

## 2024-01-16 DIAGNOSIS — Z01.818 ENCOUNTER FOR OTHER PREPROCEDURAL EXAMINATION: ICD-10-CM

## 2024-01-16 PROCEDURE — 99214 OFFICE O/P EST MOD 30 MIN: CPT

## 2024-01-16 PROCEDURE — 93000 ELECTROCARDIOGRAM COMPLETE: CPT

## 2024-01-16 RX ORDER — CLOPIDOGREL BISULFATE 75 MG/1
75 TABLET, FILM COATED ORAL DAILY
Refills: 0 | Status: ACTIVE | COMMUNITY

## 2024-01-16 NOTE — HISTORY OF PRESENT ILLNESS
[FreeTextEntry1] : 72 yo male PMH: HLD, HTN, CAD s/p PCI to Dx 1 with other non critical stenoses, carotid stenosis Parkinson's disease here today for cardiac pre op evaluation prior to Left eye cataract surgery with Dr Loja (not yet scheduled)   Cardiac cath 10/18/23 s/p PCI CHANDLER D1 EF 55%  Echo10/13/23 Left ventricular wall thickness is mildly increased. Left ventricular systolic function is normal with an ejection fraction visually estimated at 58 %. 2. Normal right ventricular cavity size and systolic function. 3. The left atrium is mildly dilated in size. 4. Mild mitral regurgitation.  Carotid US 8/2020 Mild atherosclerosis w/o evidence of significant stenosis B/L

## 2024-01-16 NOTE — REASON FOR VISIT
[Symptom and Test Evaluation] : symptom and test evaluation [Hyperlipidemia] : hyperlipidemia [Hypertension] : hypertension [Coronary Artery Disease] : coronary artery disease [Carotid, Aortic and Peripheral Vascular Disease] : carotid, aortic and peripheral vascular disease [Other: ____] : [unfilled]

## 2024-01-16 NOTE — PHYSICAL EXAM
[Soft] : abdomen soft [Non Tender] : non-tender [Normal Gait] : normal gait [No Edema] : no edema [Normal] : moves all extremities, no focal deficits, normal speech [Alert and Oriented] : alert and oriented [de-identified] : irreg

## 2024-01-16 NOTE — DISCUSSION/SUMMARY
[EKG obtained to assist in diagnosis and management of assessed problem(s)] : EKG obtained to assist in diagnosis and management of assessed problem(s) [FreeTextEntry1] : Here today for pre op Evalution prior to left eye cataract surgery No cardiovascular complaints  Patient may proceed with cataract surgery he is to continue all current medications including DAPT He will schedule routine follow up with Dr Osullivan

## 2024-01-19 ENCOUNTER — APPOINTMENT (OUTPATIENT)
Dept: CARDIOLOGY | Facility: CLINIC | Age: 74
End: 2024-01-19

## 2024-01-29 ENCOUNTER — OFFICE (OUTPATIENT)
Dept: URBAN - METROPOLITAN AREA CLINIC 102 | Facility: CLINIC | Age: 74
Setting detail: OPHTHALMOLOGY
End: 2024-01-29
Payer: MEDICARE

## 2024-01-29 DIAGNOSIS — H40.013: ICD-10-CM

## 2024-01-29 DIAGNOSIS — H25.13: ICD-10-CM

## 2024-01-29 DIAGNOSIS — H35.3132: ICD-10-CM

## 2024-01-29 DIAGNOSIS — H35.373: ICD-10-CM

## 2024-01-29 DIAGNOSIS — H25.89: ICD-10-CM

## 2024-01-29 DIAGNOSIS — H25.12: ICD-10-CM

## 2024-01-29 PROCEDURE — 99213 OFFICE O/P EST LOW 20 MIN: CPT | Performed by: OPHTHALMOLOGY

## 2024-01-29 PROCEDURE — 92136 OPHTHALMIC BIOMETRY: CPT | Mod: TC | Performed by: OPHTHALMOLOGY

## 2024-01-29 PROCEDURE — 92136 OPHTHALMIC BIOMETRY: CPT | Mod: 26,LT | Performed by: OPHTHALMOLOGY

## 2024-01-29 ASSESSMENT — REFRACTION_CURRENTRX
OD_OVR_VA: 20/
OD_SPHERE: -1.50
OS_CYLINDER: -1.25
OS_CYLINDER: -0.50
OD_SPHERE: -3.25
OS_OVR_VA: 20/
OD_SPHERE: -4.00
OD_VPRISM_DIRECTION: PROGS
OS_VPRISM_DIRECTION: SV
OD_AXIS: 115
OS_AXIS: 96
OD_ADD: +2.50
OD_OVR_VA: 20/
OS_VPRISM_DIRECTION: PROGS
OS_ADD: +2.25
OD_VPRISM_DIRECTION: SV
OD_SPHERE: -3.00
OS_SPHERE: -4.00
OS_VPRISM_DIRECTION: PROGS
OD_AXIS: 180
OD_ADD: +2.25
OD_CYLINDER: -0.50
OD_AXIS: 90
OD_CYLINDER: -0.75
OS_CYLINDER: -0.50
OS_CYLINDER: -0.25
OS_AXIS: 078
OD_ADD: +2.25
OD_CYLINDER: -0.25
OS_SPHERE: -3.25
OD_VPRISM_DIRECTION: SV
OS_VPRISM_DIRECTION: SV
OS_CYLINDER: -0.50
OS_ADD: +2.25
OS_OVR_VA: 20/
OS_VPRISM_DIRECTION: PROGS
OS_AXIS: 117
OD_OVR_VA: 20/
OD_CYLINDER: SPHERE
OD_VPRISM_DIRECTION: PROGS
OD_SPHERE: -2.50
OS_AXIS: 107
OS_AXIS: 117
OD_AXIS: 0
OD_CYLINDER: 0.00
OD_VPRISM_DIRECTION: PROGS
OS_SPHERE: -2.75
OS_SPHERE: -2.25
OS_OVR_VA: 20/
OD_AXIS: 089
OS_SPHERE: -3.75
OS_ADD: +2.50

## 2024-01-29 ASSESSMENT — REFRACTION_AUTOREFRACTION
OD_CYLINDER: -1.75
OS_SPHERE: -5.75
OD_SPHERE: -1.50
OS_CYLINDER: -0.75
OD_AXIS: 119
OS_AXIS: 093

## 2024-01-29 ASSESSMENT — SPHEQUIV_DERIVED
OD_SPHEQUIV: -2.375
OS_SPHEQUIV: -4
OS_SPHEQUIV: -6.125
OD_SPHEQUIV: -3.375
OS_SPHEQUIV: -6.25

## 2024-01-29 ASSESSMENT — DECREASING TEAR LAKE - SEVERITY SCORE
OD_DEC_TEARLAKE: 1+
OS_DEC_TEARLAKE: 1+

## 2024-01-29 ASSESSMENT — REFRACTION_MANIFEST
OD_ADD: +2.50
OD_CYLINDER: -0.25
OD_VA1: 20/30
OD_CYLINDER: SPH
OS_SPHERE: -6.00
OD_AXIS: 75
OS_CYLINDER: -0.50
OS_SPHERE: -3.75
OS_AXIS: 120
OS_VA1: 20/20-1
OS_AXIS: 090
OS_VA1: 20/NI
OD_SPHERE: -3.25
OD_VA1: 20/20
OS_CYLINDER: -0.50
OS_ADD: +2.50
OD_SPHERE: -3.25

## 2024-01-29 ASSESSMENT — CONFRONTATIONAL VISUAL FIELD TEST (CVF)
OD_FINDINGS: FULL
OS_FINDINGS: FULL

## 2024-01-29 ASSESSMENT — CORNEAL DYSTROPHY - POSTERIOR
OS_POSTERIORDYSTROPHY: T FUCHS
OD_POSTERIORDYSTROPHY: T FUCHS

## 2024-02-07 ENCOUNTER — ASC (OUTPATIENT)
Dept: URBAN - METROPOLITAN AREA SURGERY 8 | Facility: SURGERY | Age: 74
Setting detail: OPHTHALMOLOGY
End: 2024-02-07
Payer: MEDICARE

## 2024-02-07 DIAGNOSIS — H25.12: ICD-10-CM

## 2024-02-07 DIAGNOSIS — H52.212: ICD-10-CM

## 2024-02-07 PROCEDURE — A9270 NON-COVERED ITEM OR SERVICE: HCPCS | Mod: GY | Performed by: OPHTHALMOLOGY

## 2024-02-07 PROCEDURE — FEMTO FEMTOSECOND LASER: Mod: GY | Performed by: OPHTHALMOLOGY

## 2024-02-07 PROCEDURE — 66984 XCAPSL CTRC RMVL W/O ECP: CPT | Mod: LT | Performed by: OPHTHALMOLOGY

## 2024-02-07 ASSESSMENT — CORNEAL DYSTROPHY - POSTERIOR
OS_POSTERIORDYSTROPHY: T FUCHS
OD_POSTERIORDYSTROPHY: T FUCHS

## 2024-02-07 ASSESSMENT — CORNEAL EDEMA CLINICAL DESCRIPTION: OS_CORNEALEDEMA: T

## 2024-02-07 ASSESSMENT — DECREASING TEAR LAKE - SEVERITY SCORE
OS_DEC_TEARLAKE: 1+
OD_DEC_TEARLAKE: 1+

## 2024-02-08 ENCOUNTER — TELEHEALTH (OUTPATIENT)
Dept: URBAN - METROPOLITAN AREA CLINIC 71 | Facility: CLINIC | Age: 74
Setting detail: OPHTHALMOLOGY
End: 2024-02-08
Payer: MEDICARE

## 2024-02-08 DIAGNOSIS — Z96.1: ICD-10-CM

## 2024-02-08 PROCEDURE — 99024 POSTOP FOLLOW-UP VISIT: CPT | Performed by: OPHTHALMOLOGY

## 2024-02-08 ASSESSMENT — REFRACTION_CURRENTRX
OS_SPHERE: -3.25
OD_OVR_VA: 20/
OS_SPHERE: -2.75
OS_ADD: +2.50
OD_AXIS: 089
OD_AXIS: 0
OS_OVR_VA: 20/
OD_CYLINDER: -0.25
OS_SPHERE: -4.00
OD_VPRISM_DIRECTION: PROGS
OS_AXIS: 96
OS_ADD: +2.25
OD_CYLINDER: -0.50
OS_AXIS: 117
OS_SPHERE: -2.25
OD_CYLINDER: 0.00
OS_VPRISM_DIRECTION: PROGS
OS_AXIS: 078
OS_CYLINDER: -0.50
OS_ADD: +2.25
OD_VPRISM_DIRECTION: PROGS
OS_VPRISM_DIRECTION: SV
OS_SPHERE: -3.75
OS_CYLINDER: -0.50
OS_CYLINDER: -0.50
OD_SPHERE: -3.25
OD_AXIS: 180
OD_AXIS: 90
OS_OVR_VA: 20/
OD_ADD: +2.50
OS_VPRISM_DIRECTION: PROGS
OD_SPHERE: -3.00
OD_CYLINDER: SPHERE
OS_OVR_VA: 20/
OD_AXIS: 115
OD_VPRISM_DIRECTION: SV
OD_OVR_VA: 20/
OD_OVR_VA: 20/
OS_AXIS: 117
OD_CYLINDER: -0.75
OD_ADD: +2.25
OD_ADD: +2.25
OS_VPRISM_DIRECTION: SV
OS_VPRISM_DIRECTION: PROGS
OD_SPHERE: -4.00
OD_SPHERE: -1.50
OD_VPRISM_DIRECTION: SV
OS_AXIS: 107
OS_CYLINDER: -0.25
OS_CYLINDER: -1.25
OD_SPHERE: -2.50
OD_VPRISM_DIRECTION: PROGS

## 2024-02-08 ASSESSMENT — REFRACTION_MANIFEST
OD_AXIS: 75
OD_SPHERE: -3.25
OD_SPHERE: -3.25
OD_ADD: +2.50
OS_VA1: 20/NI
OS_SPHERE: -3.75
OS_SPHERE: -6.00
OS_AXIS: 120
OD_CYLINDER: SPH
OD_VA1: 20/30
OS_AXIS: 090
OS_CYLINDER: -0.50
OS_VA1: 20/20-1
OD_VA1: 20/20
OS_ADD: +2.50
OS_CYLINDER: -0.50
OD_CYLINDER: -0.25

## 2024-02-08 ASSESSMENT — REFRACTION_AUTOREFRACTION
OD_CYLINDER: -1.00
OS_CYLINDER: -0.25
OS_AXIS: 57
OD_AXIS: 125
OD_SPHERE: -2.25
OS_SPHERE: -2.50

## 2024-02-08 ASSESSMENT — SPHEQUIV_DERIVED
OD_SPHEQUIV: -2.75
OS_SPHEQUIV: -4
OS_SPHEQUIV: -2.625
OS_SPHEQUIV: -6.25
OD_SPHEQUIV: -3.375

## 2024-02-08 ASSESSMENT — CONFRONTATIONAL VISUAL FIELD TEST (CVF)
OS_FINDINGS: FULL
OD_FINDINGS: FULL

## 2024-02-15 ENCOUNTER — OFFICE (OUTPATIENT)
Dept: URBAN - METROPOLITAN AREA CLINIC 102 | Facility: CLINIC | Age: 74
Setting detail: OPHTHALMOLOGY
End: 2024-02-15
Payer: MEDICARE

## 2024-02-15 DIAGNOSIS — Z96.1: ICD-10-CM

## 2024-02-15 PROBLEM — H25.12 CATARACT SENILE NUCLEAR SCLEROSIS; LEFT EYE: Status: ACTIVE | Noted: 2024-02-08

## 2024-02-15 PROCEDURE — 99024 POSTOP FOLLOW-UP VISIT: CPT | Performed by: OPHTHALMOLOGY

## 2024-02-15 ASSESSMENT — REFRACTION_MANIFEST
OD_SPHERE: -3.25
OS_AXIS: 090
OS_CYLINDER: -0.50
OD_AXIS: 75
OS_CYLINDER: -0.50
OS_ADD: +2.50
OD_CYLINDER: SPH
OD_VA1: 20/20
OS_AXIS: 120
OD_VA1: 20/30
OS_VA1: 20/NI
OD_ADD: +2.50
OS_SPHERE: -3.75
OS_SPHERE: -6.00
OD_SPHERE: -3.25
OD_CYLINDER: -0.25
OS_VA1: 20/20-1

## 2024-02-15 ASSESSMENT — REFRACTION_CURRENTRX
OS_AXIS: 078
OD_CYLINDER: -0.75
OD_SPHERE: -3.25
OD_VPRISM_DIRECTION: PROGS
OD_AXIS: 0
OD_VPRISM_DIRECTION: PROGS
OD_ADD: +2.25
OD_SPHERE: -4.00
OS_OVR_VA: 20/
OS_ADD: +2.50
OD_OVR_VA: 20/
OD_OVR_VA: 20/
OS_AXIS: 96
OS_ADD: +2.25
OS_OVR_VA: 20/
OD_SPHERE: -1.50
OS_SPHERE: -2.75
OD_VPRISM_DIRECTION: SV
OD_AXIS: 180
OS_SPHERE: -3.25
OS_SPHERE: -2.25
OD_CYLINDER: SPHERE
OD_AXIS: 115
OS_AXIS: 117
OD_AXIS: 90
OS_AXIS: 117
OD_CYLINDER: 0.00
OD_CYLINDER: -0.25
OD_AXIS: 089
OS_SPHERE: -4.00
OD_ADD: +2.50
OS_VPRISM_DIRECTION: SV
OD_VPRISM_DIRECTION: PROGS
OS_SPHERE: -3.75
OS_CYLINDER: -0.50
OS_CYLINDER: -0.25
OS_VPRISM_DIRECTION: PROGS
OS_VPRISM_DIRECTION: SV
OS_CYLINDER: -0.50
OD_CYLINDER: -0.50
OD_OVR_VA: 20/
OS_CYLINDER: -1.25
OD_SPHERE: -3.00
OS_CYLINDER: -0.50
OD_SPHERE: -2.50
OS_ADD: +2.25
OS_AXIS: 107
OS_OVR_VA: 20/
OS_VPRISM_DIRECTION: PROGS
OD_VPRISM_DIRECTION: SV
OS_VPRISM_DIRECTION: PROGS
OD_ADD: +2.25

## 2024-02-15 ASSESSMENT — SPHEQUIV_DERIVED
OD_SPHEQUIV: -2.75
OS_SPHEQUIV: -2.5
OS_SPHEQUIV: -4
OD_SPHEQUIV: -3.375
OS_SPHEQUIV: -6.25

## 2024-02-15 ASSESSMENT — CONFRONTATIONAL VISUAL FIELD TEST (CVF)
OD_FINDINGS: FULL
OS_FINDINGS: FULL

## 2024-02-15 ASSESSMENT — REFRACTION_AUTOREFRACTION
OD_CYLINDER: -1.50
OS_AXIS: 000
OD_SPHERE: -2.00
OD_AXIS: 112
OS_SPHERE: -2.50
OS_CYLINDER: 0.00

## 2024-02-15 ASSESSMENT — DECREASING TEAR LAKE - SEVERITY SCORE
OS_DEC_TEARLAKE: 1+
OD_DEC_TEARLAKE: 1+

## 2024-02-15 ASSESSMENT — CORNEAL DYSTROPHY - POSTERIOR
OS_POSTERIORDYSTROPHY: T FUCHS
OD_POSTERIORDYSTROPHY: T FUCHS

## 2024-03-07 ENCOUNTER — APPOINTMENT (OUTPATIENT)
Dept: CARDIOLOGY | Facility: CLINIC | Age: 74
End: 2024-03-07
Payer: MEDICARE

## 2024-03-07 VITALS
OXYGEN SATURATION: 96 % | WEIGHT: 173 LBS | SYSTOLIC BLOOD PRESSURE: 140 MMHG | DIASTOLIC BLOOD PRESSURE: 64 MMHG | BODY MASS INDEX: 27.92 KG/M2 | HEART RATE: 91 BPM

## 2024-03-07 DIAGNOSIS — Z23 ENCOUNTER FOR IMMUNIZATION: ICD-10-CM

## 2024-03-07 PROCEDURE — G2211 COMPLEX E/M VISIT ADD ON: CPT

## 2024-03-07 PROCEDURE — 93000 ELECTROCARDIOGRAM COMPLETE: CPT

## 2024-03-07 PROCEDURE — 99214 OFFICE O/P EST MOD 30 MIN: CPT

## 2024-03-07 NOTE — DISCUSSION/SUMMARY
[EKG obtained to assist in diagnosis and management of assessed problem(s)] : EKG obtained to assist in diagnosis and management of assessed problem(s) [FreeTextEntry1] : Here today for routine follow up with above hx,  CAD: CW cardiac rehab no CP/SOB CW current medications  Carotid US 8/2020 Mild atherosclerosis w/o evidence of significant stenosis B/L repeat Carotid US ordered  Will require SBE prophylaxis given prior shoulder surgery/hardware

## 2024-03-07 NOTE — HISTORY OF PRESENT ILLNESS
[FreeTextEntry1] : 74 yo male PMH: HLD, HTN, CAD s/p PCI to D1 with other non critical stenoses, carotid stenosis, Parkinson's disease, s/p L shoulder total replacement with hardware here today for routine cardiac follow up Claims to be doing well no cardiovascular complaints   Cardiac cath 10/18/23 s/p PCI CHANDLER D1 EF 55%  Echo10/13/23 Left ventricular wall thickness is mildly increased. Left ventricular systolic function is normal with an ejection fraction visually estimated at 58 %. 2. Normal right ventricular cavity size and systolic function. 3. The left atrium is mildly dilated in size. 4. Mild mitral regurgitation.  Carotid US 8/2020 Mild atherosclerosis w/o evidence of significant stenosis B/L

## 2024-03-19 ENCOUNTER — NON-APPOINTMENT (OUTPATIENT)
Age: 74
End: 2024-03-19

## 2024-03-19 ENCOUNTER — OFFICE (OUTPATIENT)
Dept: URBAN - METROPOLITAN AREA CLINIC 102 | Facility: CLINIC | Age: 74
Setting detail: OPHTHALMOLOGY
End: 2024-03-19
Payer: MEDICARE

## 2024-03-19 DIAGNOSIS — Z96.1: ICD-10-CM

## 2024-03-19 PROCEDURE — 99024 POSTOP FOLLOW-UP VISIT: CPT | Performed by: OPHTHALMOLOGY

## 2024-03-19 ASSESSMENT — REFRACTION_CURRENTRX
OD_AXIS: 180
OS_VPRISM_DIRECTION: PROGS
OS_AXIS: 078
OD_OVR_VA: 20/
OS_OVR_VA: 20/
OS_ADD: +2.25
OS_SPHERE: -3.25
OD_SPHERE: -2.50
OS_CYLINDER: -0.50
OS_AXIS: 96
OS_AXIS: 117
OS_AXIS: 107
OD_ADD: +2.50
OD_AXIS: 115
OS_SPHERE: -2.25
OS_ADD: +2.25
OD_SPHERE: -3.25
OS_CYLINDER: -0.25
OS_SPHERE: -2.75
OS_CYLINDER: -0.50
OS_VPRISM_DIRECTION: PROGS
OD_CYLINDER: SPHERE
OD_VPRISM_DIRECTION: PROGS
OS_VPRISM_DIRECTION: SV
OS_OVR_VA: 20/
OD_CYLINDER: -0.50
OD_VPRISM_DIRECTION: SV
OS_AXIS: 117
OS_SPHERE: -3.75
OD_CYLINDER: -0.75
OD_VPRISM_DIRECTION: SV
OD_VPRISM_DIRECTION: PROGS
OD_VPRISM_DIRECTION: PROGS
OD_ADD: +2.25
OD_SPHERE: -4.00
OD_AXIS: 0
OS_VPRISM_DIRECTION: PROGS
OD_CYLINDER: 0.00
OS_VPRISM_DIRECTION: SV
OS_OVR_VA: 20/
OD_SPHERE: -3.00
OD_OVR_VA: 20/
OD_CYLINDER: -0.25
OD_AXIS: 089
OS_ADD: +2.50
OS_CYLINDER: -1.25
OS_SPHERE: -4.00
OD_ADD: +2.25
OD_OVR_VA: 20/
OD_SPHERE: -1.50
OD_AXIS: 90
OS_CYLINDER: -0.50

## 2024-03-19 ASSESSMENT — REFRACTION_MANIFEST
OD_ADD: +2.50
OS_VA1: 20/20-1
OD_CYLINDER: -0.25
OS_CYLINDER: -0.50
OS_CYLINDER: -0.50
OS_ADD: +2.50
OD_AXIS: 75
OS_AXIS: 090
OS_VA1: 20/NI
OD_SPHERE: -3.25
OD_VA1: 20/30
OD_VA1: 20/20
OS_SPHERE: -3.75
OS_SPHERE: -6.00
OS_AXIS: 120
OD_SPHERE: -3.25
OD_CYLINDER: SPH

## 2024-03-19 ASSESSMENT — SPHEQUIV_DERIVED
OS_SPHEQUIV: -4
OD_SPHEQUIV: -3.375
OS_SPHEQUIV: -6.25

## 2024-04-10 ENCOUNTER — RX RENEWAL (OUTPATIENT)
Age: 74
End: 2024-04-10

## 2024-04-10 RX ORDER — HYDROCHLOROTHIAZIDE 25 MG/1
25 TABLET ORAL
Qty: 90 | Refills: 1 | Status: ACTIVE | COMMUNITY
Start: 2024-04-10 | End: 1900-01-01

## 2024-04-22 ENCOUNTER — RX RENEWAL (OUTPATIENT)
Age: 74
End: 2024-04-22

## 2024-04-22 RX ORDER — VARDENAFIL 20 MG/1
20 TABLET, FILM COATED ORAL
Qty: 6 | Refills: 11 | Status: ACTIVE | COMMUNITY
Start: 2021-05-08 | End: 1900-01-01

## 2024-04-24 RX ORDER — LOSARTAN POTASSIUM 25 MG/1
25 TABLET, FILM COATED ORAL
Qty: 90 | Refills: 1 | Status: ACTIVE | COMMUNITY
Start: 2020-12-14 | End: 1900-01-01

## 2024-05-15 ENCOUNTER — APPOINTMENT (OUTPATIENT)
Dept: NEUROLOGY | Facility: CLINIC | Age: 74
End: 2024-05-15
Payer: MEDICARE

## 2024-05-15 VITALS
WEIGHT: 170 LBS | BODY MASS INDEX: 27.32 KG/M2 | HEIGHT: 66 IN | DIASTOLIC BLOOD PRESSURE: 74 MMHG | TEMPERATURE: 97.8 F | HEART RATE: 72 BPM | SYSTOLIC BLOOD PRESSURE: 151 MMHG

## 2024-05-15 DIAGNOSIS — R25.2 CRAMP AND SPASM: ICD-10-CM

## 2024-05-15 DIAGNOSIS — G47.52 REM SLEEP BEHAVIOR DISORDER: ICD-10-CM

## 2024-05-15 PROCEDURE — G2211 COMPLEX E/M VISIT ADD ON: CPT

## 2024-05-15 PROCEDURE — 99213 OFFICE O/P EST LOW 20 MIN: CPT

## 2024-05-15 RX ORDER — AMANTADINE HYDROCHLORIDE 100 1/1
100 TABLET ORAL
Qty: 60 | Refills: 3 | Status: ACTIVE | COMMUNITY
Start: 2024-05-15 | End: 1900-01-01

## 2024-05-15 RX ORDER — RASAGILINE 1 MG/1
1 TABLET ORAL DAILY
Qty: 90 | Refills: 3 | Status: ACTIVE | COMMUNITY
Start: 2021-06-09 | End: 1900-01-01

## 2024-05-15 RX ORDER — ROPINIROLE 6 MG/1
6 TABLET, FILM COATED, EXTENDED RELEASE ORAL
Qty: 180 | Refills: 3 | Status: ACTIVE | COMMUNITY
Start: 2021-10-25 | End: 1900-01-01

## 2024-05-15 NOTE — HISTORY OF PRESENT ILLNESS
[FreeTextEntry1] : Initial Visit 10/25/21: Mr. Schulz is here today for neurology evaluation. He was previously seeing Dr. Andreas Benedict from 2015 to 2017 and then saw Dr. Willian Agarwal at [place]. He was diagnosed with Parkinson's Disease in 2015. He has tremor in his right hand and has not had significant progression in symptoms. His rest tremor is still isolated to his right hand. He notes some mild issues with balance and goes to Pilates to help with balance. He denies having any falls. He remains very active.  He has some mild difficulty swallowing, particularly if the food is dry.  He states that sleep is terrible. He has shoulder issues affecting his sleep. His wife is an active sleeper. He does believe that he snores. He has had dream enactment once or twice a year - kicks while dreaming that he is playing soccer.  He is under a lot of stress due to his wife's health issues. He feels that his mood is situational and describes himself as fatalistic.  He notes some mild short term memory issues but remains independent in his ADLs and takes care of the household finances.  He was started on carbidopa/levodopa by Dr. Agarwal but does not tolerate it. He states that he is less functional when taking this medication.  Current PD meds: Rasagaline 1 mg/day Ropinirole 6 mg BID  He denies having side effects with this medication.   Interval History 1/5/21: Due to connection issues with [facility], Doximity was used. He would like to have shoulder surgery on the left side with Dr. Briscoe at [facility]. He was told that he would need neurological clearance.  He feels that symptoms are relatively stable. He does sometimes notice some increased amplitude and frequency of tremors. Balance is stable. He is doing Pilates and working out daily. He rarely has some difficulty swallowing dry food. He feels that memory is compatible with age.  His shoulder pain affect his sleep.  He has undergone anesthesia before without difficulty.  4/28/22: He did not end up needing to change his medication. Insurance did cover the ropinirole ER. He had left shoulder replacement about five weeks ago. He recently started PT. He believes that his PD symptoms are somewhat exacerbated since surgery. He reports increased amplitude of his right hand tremor and notices it more.  He reports that he did not have covid but reports brain fog. He has had multiple negative covid tests. He finds himself falling asleep at the computer. He is not taking pain medications for his shoulder.  He was noticing brain fog before the surgery. He is not sleeping well but it is improved since before surgery and since being in an immobilizer.  He denies worsening of balance.  He has dreams of playing soccer and his wife reports kicking in his sleep.   He has noticed some mild difficulty with swallowing if eating dry food.   He has also noticed muscle cramps at night.   10/27/22: He reports feeling tired. He sleeps 6 hours per night and tends to doze during the day. He does report snoring. He denies recent dream enactment behavior. He feels that he is not back to his baseline since shoulder surgery. He completed PT and is exercising in his home gym.  He notices an increase in frequency and amplitude in his right hand tremor. He does not notice tremor on the left.  He is having leg cramps at night. He takes magnesium and potassium. He admits to inadequate water intake.   3/12/23:  He fell ~ 6 weeks ago while playing pickle ball while wearing improper footwear. He was in [place] at the time. He had an abrasion on his forehead and a hematoma on his right knee. He still has swelling. He has seen Dr. Nolen and has had 2 ultrasounds. He is scheduled to see Dr. Cruz.  He is fading faster in the afternoon. He finds it more difficult to complete tasks because he is dozing off.  He is not getting adequate sleep. He stays up until 12 or 1 AM and wakes up by 6 AM. He does take naps during the day.   He continues to take ropinirole ER 6 mg BID and rasagiline 1 mg daily. He currently takes ropinirole mid-morning and at bedtime.  He reports dream enactment about twice a year. Typically he is "playing soccer."  He reports some discomfort in his feet which he describes as a sensitivity.  He has some GERD. He is taking famotidine. He denies coughing when eating.   11/29/23: He had recent chest pressure and had a cardiac stent placed.  He is also scheduled for cataract surgery (left before right). He notes progression of PD symptoms, mainly increased tremor in his right hand. He notes that is worse today due to aggravation. He says that the tremor affects his ability to shave. He is starting to notice some difficulty with balance. He has not had any recent falls. He denies difficulty swallowing other than with dry food. He is not aware of any recent dream enactment.  Current medications: Rasagaline 1 mg/day Ropinirole ER 6 mg  BID  He denies any compulsive behavior, dizziness, nausea.  He is currently going to cardiac rehab twice a week and Pilates once a week.  He reports swelling in his ankles and increased numbness in his feet.  5/15/24: He notes worsening of his tremor in both frequency and amplitude.  He has started to use his left hand on the mouse.   He has not fallen but feels clumsier. He continues regular exercise.  He denies difficulty swallowing. He denies dream enactment behavior.   He had a cardiac stent placed earlier this year.   He is only sleeping for 4-5 hours per night. He naps after dinner and then stays up until 1-2 AM.  He denies being overly sleepy during the day and is "big on power naps".  He received prism glasses for double vision.

## 2024-05-15 NOTE — DATA REVIEWED
[de-identified] : MRI brain without contrast 11/27/15: Mild volume loss with a few foci of increased T2 and FLAIR signal in the white matter. These are nonspecific and likely minimal microvascular ischemic change. There is no restricted diffusion to suggest new ischemic change. \par  There is no acute hemorrhage or midline shift. [de-identified] : Home sleep study 6/6/22: AHI 6\par  Supine AHI is 8 and non-supine AHI is 0.7.

## 2024-05-15 NOTE — PHYSICAL EXAM
[FreeTextEntry1] : Examination: Constitutional: normal, no apparent distress Eyes: normal conjunctiva b/l, no ptosis, visual fields full Respiratory: no respiratory distress, normal effort, normal auscultation Cardiovascular: normal rate, rhythm, no murmurs Neck: supple, no masses Vascular: carotids normal Skin: normal color, no rashes Psych: normal mood, affect  Neurological: Masked facies Memory: normal memory, oriented to person, place, time Language intact/no aphasia Cranial Nerves: hypophonia, Pupils equally round and reactive to light, ocular muscles/movements intact, no ptosis, no facial weakness, tongue protrudes normally in the midline, slight hypophonia Motor: normal tone, + bradkinesia, no pronator drift, full strength in all four extremities in the proximal and distal muscle groups Coordination: Decreased amplitude and speed of rapid alternating movements on right. + rest tremor on right. No cogwheel rigidity. Finger to nose intact bilaterally Sensory: Decreased sensation in left forearm DTRs: symmetric, 1+ in b/l triceps, 1+ in b/l biceps, 1+ in b/l brachioradialis, 1+ in bilateral patellars Gait: narrow based, steady, good stride length, reduced arm swing, right tremor observed while walking.

## 2024-05-15 NOTE — DISCUSSION/SUMMARY
[FreeTextEntry1] : Mr. Schulz is a 74 year old man with a right hand tremor. He was diagnosed with Parkinson's Disease in 2015.  Parkinson's Disease -Tremor predominant -Continue rasagiline 1 mg/day -Continue Ropinirole ER 6 mg BID. Advised that we can add an immediate release dose mid-day if needed. At this time I think that increasing the dose may increase side effects without significant benefit.  -He has not tolerated carbidopa/levodopa in the past. -Can try amantadine 100 mg/day and if tolerated increase to 100mg BID. Discussed potential side effects including hallucinations. Will d/c if this recurs.  -Continue daily exercise -Will also give contact info for DBS program.   Snoring, poor sleep -Home sleep study showed mild CARTER, not significant when non-supine. He prefers to sleep on his side and will continue with positional therapy for now. -Increase sleep duration at night. Cat naps if needed.  Leg Cramps -Increase hydration during the day. -Continue magnesium  Dream Enactment Behavior -Rare episodes of dream enactment behavior, none recently. -Can start melatonin if they recur.  follow-up 6 months, sooner if needed.

## 2024-05-21 ENCOUNTER — APPOINTMENT (OUTPATIENT)
Dept: VASCULAR SURGERY | Facility: CLINIC | Age: 74
End: 2024-05-21

## 2024-06-03 ENCOUNTER — RX RENEWAL (OUTPATIENT)
Age: 74
End: 2024-06-03

## 2024-06-11 ENCOUNTER — APPOINTMENT (OUTPATIENT)
Dept: CARDIOLOGY | Facility: CLINIC | Age: 74
End: 2024-06-11

## 2024-06-17 ENCOUNTER — OFFICE (OUTPATIENT)
Dept: URBAN - METROPOLITAN AREA CLINIC 102 | Facility: CLINIC | Age: 74
Setting detail: OPHTHALMOLOGY
End: 2024-06-17
Payer: MEDICARE

## 2024-06-17 DIAGNOSIS — H43.813: ICD-10-CM

## 2024-06-17 DIAGNOSIS — H16.223: ICD-10-CM

## 2024-06-17 DIAGNOSIS — H26.491: ICD-10-CM

## 2024-06-17 DIAGNOSIS — H25.89: ICD-10-CM

## 2024-06-17 DIAGNOSIS — H35.373: ICD-10-CM

## 2024-06-17 DIAGNOSIS — H40.013: ICD-10-CM

## 2024-06-17 DIAGNOSIS — Z96.1: ICD-10-CM

## 2024-06-17 DIAGNOSIS — H35.3132: ICD-10-CM

## 2024-06-17 PROCEDURE — 92014 COMPRE OPH EXAM EST PT 1/>: CPT | Performed by: OPHTHALMOLOGY

## 2024-06-17 PROCEDURE — 92134 CPTRZ OPH DX IMG PST SGM RTA: CPT | Performed by: OPHTHALMOLOGY

## 2024-06-17 ASSESSMENT — CONFRONTATIONAL VISUAL FIELD TEST (CVF)
OD_FINDINGS: FULL
OS_FINDINGS: FULL

## 2024-06-25 DIAGNOSIS — I65.29 OCCLUSION AND STENOSIS OF UNSPECIFIED CAROTID ARTERY: ICD-10-CM

## 2024-06-27 ENCOUNTER — NON-APPOINTMENT (OUTPATIENT)
Age: 74
End: 2024-06-27

## 2024-06-27 ENCOUNTER — APPOINTMENT (OUTPATIENT)
Dept: CARDIOLOGY | Facility: CLINIC | Age: 74
End: 2024-06-27
Payer: MEDICARE

## 2024-06-27 VITALS
HEART RATE: 76 BPM | HEIGHT: 66 IN | DIASTOLIC BLOOD PRESSURE: 58 MMHG | WEIGHT: 167 LBS | SYSTOLIC BLOOD PRESSURE: 130 MMHG | OXYGEN SATURATION: 98 % | BODY MASS INDEX: 26.84 KG/M2

## 2024-06-27 VITALS — SYSTOLIC BLOOD PRESSURE: 132 MMHG | DIASTOLIC BLOOD PRESSURE: 64 MMHG

## 2024-06-27 DIAGNOSIS — G20.A1 PARKINSON'S DISEASE WITHOUT DYSKINESIA, WITHOUT MENTION OF FLUCTUATIONS: ICD-10-CM

## 2024-06-27 DIAGNOSIS — I10 ESSENTIAL (PRIMARY) HYPERTENSION: ICD-10-CM

## 2024-06-27 DIAGNOSIS — I25.10 ATHEROSCLEROTIC HEART DISEASE OF NATIVE CORONARY ARTERY W/OUT ANGINA PECTORIS: ICD-10-CM

## 2024-06-27 DIAGNOSIS — E78.00 PURE HYPERCHOLESTEROLEMIA, UNSPECIFIED: ICD-10-CM

## 2024-06-27 PROCEDURE — 93000 ELECTROCARDIOGRAM COMPLETE: CPT

## 2024-06-27 PROCEDURE — 99214 OFFICE O/P EST MOD 30 MIN: CPT

## 2024-06-27 PROCEDURE — 93880 EXTRACRANIAL BILAT STUDY: CPT

## 2024-09-12 ENCOUNTER — RX RENEWAL (OUTPATIENT)
Age: 74
End: 2024-09-12

## 2024-09-26 ENCOUNTER — APPOINTMENT (OUTPATIENT)
Dept: CARDIOLOGY | Facility: CLINIC | Age: 74
End: 2024-09-26
Payer: MEDICARE

## 2024-09-26 ENCOUNTER — NON-APPOINTMENT (OUTPATIENT)
Age: 74
End: 2024-09-26

## 2024-09-26 VITALS
WEIGHT: 163 LBS | HEART RATE: 84 BPM | DIASTOLIC BLOOD PRESSURE: 66 MMHG | SYSTOLIC BLOOD PRESSURE: 136 MMHG | HEIGHT: 66 IN | OXYGEN SATURATION: 98 % | BODY MASS INDEX: 26.2 KG/M2

## 2024-09-26 VITALS — DIASTOLIC BLOOD PRESSURE: 70 MMHG | SYSTOLIC BLOOD PRESSURE: 134 MMHG

## 2024-09-26 DIAGNOSIS — E53.8 DEFICIENCY OF OTHER SPECIFIED B GROUP VITAMINS: ICD-10-CM

## 2024-09-26 PROCEDURE — 93000 ELECTROCARDIOGRAM COMPLETE: CPT

## 2024-09-26 PROCEDURE — 99214 OFFICE O/P EST MOD 30 MIN: CPT

## 2024-09-26 NOTE — HISTORY OF PRESENT ILLNESS
[FreeTextEntry1] : 75 yo male PMH: HLD, HTN, CAD s/p PCI to D1 with other non critical stenoses, carotid stenosis, Parkinson's disease, s/p L shoulder total replacement with hardware here today for routine cardiac follow up  Claims to be doing well no new cardiovascular complaints.  Denies cp or sob.  Notes occ palpitations, unchanged. Chronic ankle edema which worsens as day goes on.  Pt has lost 14 pounds (intentional) by watching his diet and exercising

## 2024-09-26 NOTE — CARDIOLOGY SUMMARY
[de-identified] : 9/26/24: RSR, APC's [de-identified] : Echo10/13/23 Left ventricular wall thickness is mildly increased. Left ventricular systolic function is normal with an ejection fraction visually estimated at 58 %. Normal right ventricular cavity size and systolic function,  The left atrium is mildly dilated in size. Mild mitral regurgitation [de-identified] : Cardiac cath 10/18/23 s/p PCI CHANDLER D1 EF 55% [de-identified] : Carotid US 6/30/24 Right prox ICA Normal, Left prox ICA normal; vertebral arteries: antegrade flow bilaterally Carotid US 8/2020 Mild atherosclerosis w/o evidence of significant stenosis B/L

## 2024-09-26 NOTE — PHYSICAL EXAM
[Normal S1, S2] : normal S1, S2 [Soft] : abdomen soft [Non Tender] : non-tender [Normal Gait] : normal gait [Normal] : moves all extremities, no focal deficits, normal speech [Alert and Oriented] : alert and oriented [Well Developed] : well developed [Well Nourished] : well nourished [No Acute Distress] : no acute distress [Normal Conjunctiva] : normal conjunctiva [Normal Venous Pressure] : normal venous pressure [No Carotid Bruit] : no carotid bruit [No Murmur] : no murmur [Clear Lung Fields] : clear lung fields [Edema ___] : edema [unfilled] [No Rash] : no rash [Moves all extremities] : moves all extremities

## 2024-09-26 NOTE — DISCUSSION/SUMMARY
[EKG obtained to assist in diagnosis and management of assessed problem(s)] : EKG obtained to assist in diagnosis and management of assessed problem(s) [FreeTextEntry1] : Here today for routine follow up with above hx,  No CP/SOB.  Notes occ palpitations; unchanged.   CAD: Stable no cardiovascular complaints.  EKG unchanged.  CW current medications.  Pt intolerant to statin therapy.  Continue RYR.  Labs with PCP  Carotid US 8/2020 Mild atherosclerosis w/o evidence of significant stenosis B/L repeat Carotid US was normal.     Will require SBE prophylaxis given prior shoulder surgery/hardware   Parkinsons: Followed with Neurology   labs with PCP  OV 3 months

## 2024-09-30 ENCOUNTER — APPOINTMENT (OUTPATIENT)
Dept: FAMILY MEDICINE | Facility: CLINIC | Age: 74
End: 2024-09-30
Payer: MEDICARE

## 2024-09-30 VITALS
HEIGHT: 66 IN | WEIGHT: 162 LBS | DIASTOLIC BLOOD PRESSURE: 68 MMHG | SYSTOLIC BLOOD PRESSURE: 118 MMHG | OXYGEN SATURATION: 96 % | HEART RATE: 81 BPM | TEMPERATURE: 97.9 F | BODY MASS INDEX: 26.03 KG/M2

## 2024-09-30 DIAGNOSIS — C61 MALIGNANT NEOPLASM OF PROSTATE: ICD-10-CM

## 2024-09-30 DIAGNOSIS — L98.9 DISORDER OF THE SKIN AND SUBCUTANEOUS TISSUE, UNSPECIFIED: ICD-10-CM

## 2024-09-30 DIAGNOSIS — E78.00 PURE HYPERCHOLESTEROLEMIA, UNSPECIFIED: ICD-10-CM

## 2024-09-30 DIAGNOSIS — I10 ESSENTIAL (PRIMARY) HYPERTENSION: ICD-10-CM

## 2024-09-30 DIAGNOSIS — I65.29 OCCLUSION AND STENOSIS OF UNSPECIFIED CAROTID ARTERY: ICD-10-CM

## 2024-09-30 DIAGNOSIS — M19.90 UNSPECIFIED OSTEOARTHRITIS, UNSPECIFIED SITE: ICD-10-CM

## 2024-09-30 DIAGNOSIS — G20.A1 PARKINSON'S DISEASE WITHOUT DYSKINESIA, WITHOUT MENTION OF FLUCTUATIONS: ICD-10-CM

## 2024-09-30 DIAGNOSIS — Z00.00 ENCOUNTER FOR GENERAL ADULT MEDICAL EXAMINATION W/OUT ABNORMAL FINDINGS: ICD-10-CM

## 2024-09-30 PROCEDURE — G0439: CPT

## 2024-09-30 PROCEDURE — 90656 IIV3 VACC NO PRSV 0.5 ML IM: CPT

## 2024-09-30 PROCEDURE — 36415 COLL VENOUS BLD VENIPUNCTURE: CPT

## 2024-09-30 PROCEDURE — G0008: CPT

## 2024-09-30 RX ORDER — EZETIMIBE 10 MG/1
10 TABLET ORAL
Refills: 0 | Status: ACTIVE | COMMUNITY

## 2024-09-30 NOTE — PHYSICAL EXAM
[No Acute Distress] : no acute distress [Well Nourished] : well nourished [Well Developed] : well developed [Well-Appearing] : well-appearing [Normal Sclera/Conjunctiva] : normal sclera/conjunctiva [PERRL] : pupils equal round and reactive to light [EOMI] : extraocular movements intact [Normal Outer Ear/Nose] : the outer ears and nose were normal in appearance [Normal Oropharynx] : the oropharynx was normal [No JVD] : no jugular venous distention [No Lymphadenopathy] : no lymphadenopathy [Thyroid Normal, No Nodules] : the thyroid was normal and there were no nodules present [Supple] : supple [No Respiratory Distress] : no respiratory distress  [No Accessory Muscle Use] : no accessory muscle use [Clear to Auscultation] : lungs were clear to auscultation bilaterally [Normal Rate] : normal rate  [Regular Rhythm] : with a regular rhythm [Normal S1, S2] : normal S1 and S2 [No Murmur] : no murmur heard [No Carotid Bruits] : no carotid bruits [No Abdominal Bruit] : a ~M bruit was not heard ~T in the abdomen [No Varicosities] : no varicosities [Pedal Pulses Present] : the pedal pulses are present [No Edema] : there was no peripheral edema [No Palpable Aorta] : no palpable aorta [No Extremity Clubbing/Cyanosis] : no extremity clubbing/cyanosis [Soft] : abdomen soft [Non Tender] : non-tender [Non-distended] : non-distended [No Masses] : no abdominal mass palpated [No HSM] : no HSM [Normal Bowel Sounds] : normal bowel sounds [Normal Posterior Cervical Nodes] : no posterior cervical lymphadenopathy [Normal Anterior Cervical Nodes] : no anterior cervical lymphadenopathy [No CVA Tenderness] : no CVA  tenderness [No Spinal Tenderness] : no spinal tenderness [No Joint Swelling] : no joint swelling [Grossly Normal Strength/Tone] : grossly normal strength/tone [No Rash] : no rash [Coordination Grossly Intact] : coordination grossly intact [No Focal Deficits] : no focal deficits [Normal Gait] : normal gait [Deep Tendon Reflexes (DTR)] : deep tendon reflexes were 2+ and symmetric [Normal Affect] : the affect was normal [Normal Insight/Judgement] : insight and judgment were intact [de-identified] : 0.5 cm raised papule on left posterior neck

## 2024-09-30 NOTE — ASSESSMENT
[FreeTextEntry1] : МАРИНА MELVIN is a 74 year old male here for a physical exam.  Patient has a history of carotid atherosclerosis, arthritis, hypercholesterolemia, hypertension, prostate cancer, and Parkinson's disease.  He is seeing Dr. Forde for his Parkinson's disease. He is now on Amantadine which he feels is helping. He does still have stiffness and feels "foggy" in the afternoon. He also states that he does not sleep well but he has never slept well.  He sees a cardiologist regularly and does not need an EKG today.  He reports a "bump" on the left side of his neck. He originally thought it was a tick but he could not pull it off. It has been there for about 2 months. He does not feel that it is enlarging and it bleeds if he scratches it.

## 2024-09-30 NOTE — HISTORY OF PRESENT ILLNESS
[FreeTextEntry1] : МАРИНА MELVIN is a 74 year old male here for a physical exam. [de-identified] : His last physical exam was last year  Vaccines: Tetanus is up to date, 2/12/2015 Pneumococcal vaccination is up to date Shingrix is NOT up to date  His last dentist visit was more than one year ago His last eye doctor appointment was less than one year ago [facility]  Colon cancer screening is up to date, colonoscopy 5/23/2019  His diet is healthy overall Exercise: calisthenics, cycling, weights, stretching, balance exercises

## 2024-09-30 NOTE — HISTORY OF PRESENT ILLNESS
[FreeTextEntry1] : МАРИНА MELVIN is a 74 year old male here for a physical exam. [de-identified] : His last physical exam was last year  Vaccines: Tetanus is up to date, 2/12/2015 Pneumococcal vaccination is up to date Shingrix is NOT up to date  His last dentist visit was more than one year ago His last eye doctor appointment was less than one year ago [facility]  Colon cancer screening is up to date, colonoscopy 5/23/2019  His diet is healthy overall Exercise: calisthenics, cycling, weights, stretching, balance exercises

## 2024-09-30 NOTE — HEALTH RISK ASSESSMENT
[No falls in past year] : Patient reported no falls in the past year [0] : 2) Feeling down, depressed, or hopeless: Not at all (0) [PHQ-2 Negative - No further assessment needed] : PHQ-2 Negative - No further assessment needed [Never] : Never [[id number]] : 0

## 2024-09-30 NOTE — HEALTH RISK ASSESSMENT
[No falls in past year] : Patient reported no falls in the past year [0] : 2) Feeling down, depressed, or hopeless: Not at all (0) [PHQ-2 Negative - No further assessment needed] : PHQ-2 Negative - No further assessment needed [Never] : Never [[id number]] : 0

## 2024-09-30 NOTE — PLAN
[FreeTextEntry1] : Continue all medications as prescribed. Check labs as above. Will adjust any medications based upon lab results.  Reviewed age-appropriate preventive screening tests with patient. He is due for a colonoscopy and will call Dr. Zepeda's office to schedule this.  Will refer to dermatology for evaluation of the papule on his neck since this may be a skin cancer.  Recommended he get the Shingrix vaccines at the pharmacy since these should be covered as part of the Medicare prescription plan.  Discussed clean eating (eg Mediterranean style eating plan) and regular exercise/staying as physically active as possible.  Include balance exercises and strength training and core strengthening exercises for bone health and to decrease risk for falls.  Reviewed importance of good self care (e.g. meditation, yoga, adequate rest, regular exercise, magnesium, clean eating, etc.).  Follow up for next physical in one year.

## 2024-09-30 NOTE — PHYSICAL EXAM
[No Acute Distress] : no acute distress [Well Nourished] : well nourished [Well Developed] : well developed [Well-Appearing] : well-appearing [Normal Sclera/Conjunctiva] : normal sclera/conjunctiva [PERRL] : pupils equal round and reactive to light [EOMI] : extraocular movements intact [Normal Outer Ear/Nose] : the outer ears and nose were normal in appearance [Normal Oropharynx] : the oropharynx was normal [No JVD] : no jugular venous distention [No Lymphadenopathy] : no lymphadenopathy [Supple] : supple [Thyroid Normal, No Nodules] : the thyroid was normal and there were no nodules present [No Respiratory Distress] : no respiratory distress  [No Accessory Muscle Use] : no accessory muscle use [Clear to Auscultation] : lungs were clear to auscultation bilaterally [Normal Rate] : normal rate  [Regular Rhythm] : with a regular rhythm [Normal S1, S2] : normal S1 and S2 [No Murmur] : no murmur heard [No Carotid Bruits] : no carotid bruits [No Abdominal Bruit] : a ~M bruit was not heard ~T in the abdomen [No Varicosities] : no varicosities [Pedal Pulses Present] : the pedal pulses are present [No Edema] : there was no peripheral edema [No Palpable Aorta] : no palpable aorta [No Extremity Clubbing/Cyanosis] : no extremity clubbing/cyanosis [Soft] : abdomen soft [Non Tender] : non-tender [Non-distended] : non-distended [No Masses] : no abdominal mass palpated [No HSM] : no HSM [Normal Bowel Sounds] : normal bowel sounds [Normal Posterior Cervical Nodes] : no posterior cervical lymphadenopathy [Normal Anterior Cervical Nodes] : no anterior cervical lymphadenopathy [No CVA Tenderness] : no CVA  tenderness [No Spinal Tenderness] : no spinal tenderness [No Joint Swelling] : no joint swelling [Grossly Normal Strength/Tone] : grossly normal strength/tone [No Rash] : no rash [Coordination Grossly Intact] : coordination grossly intact [No Focal Deficits] : no focal deficits [Normal Gait] : normal gait [Deep Tendon Reflexes (DTR)] : deep tendon reflexes were 2+ and symmetric [Normal Affect] : the affect was normal [Normal Insight/Judgement] : insight and judgment were intact [de-identified] : 0.5 cm raised papule on left posterior neck

## 2024-09-30 NOTE — REVIEW OF SYSTEMS
[Negative] : Heme/Lymph [FreeTextEntry9] : right leg swelling [de-identified] : tick bite [de-identified] : paresthesias of legs and feet

## 2024-09-30 NOTE — REVIEW OF SYSTEMS
[Negative] : Heme/Lymph [FreeTextEntry9] : right leg swelling [de-identified] : tick bite [de-identified] : paresthesias of legs and feet

## 2024-10-01 ENCOUNTER — TRANSCRIPTION ENCOUNTER (OUTPATIENT)
Age: 74
End: 2024-10-01

## 2024-10-01 ENCOUNTER — RX RENEWAL (OUTPATIENT)
Age: 74
End: 2024-10-01

## 2024-10-03 ENCOUNTER — TRANSCRIPTION ENCOUNTER (OUTPATIENT)
Age: 74
End: 2024-10-03

## 2024-10-17 ENCOUNTER — RX RENEWAL (OUTPATIENT)
Age: 74
End: 2024-10-17

## 2024-10-18 ENCOUNTER — RESULT REVIEW (OUTPATIENT)
Age: 74
End: 2024-10-18

## 2024-10-18 ENCOUNTER — OUTPATIENT (OUTPATIENT)
Dept: OUTPATIENT SERVICES | Facility: HOSPITAL | Age: 74
LOS: 1 days | End: 2024-10-18
Payer: MEDICARE

## 2024-10-18 ENCOUNTER — APPOINTMENT (OUTPATIENT)
Dept: ULTRASOUND IMAGING | Facility: CLINIC | Age: 74
End: 2024-10-18
Payer: MEDICARE

## 2024-10-18 ENCOUNTER — APPOINTMENT (OUTPATIENT)
Dept: ORTHOPEDIC SURGERY | Facility: CLINIC | Age: 74
End: 2024-10-18

## 2024-10-18 VITALS
SYSTOLIC BLOOD PRESSURE: 142 MMHG | DIASTOLIC BLOOD PRESSURE: 78 MMHG | HEIGHT: 69 IN | BODY MASS INDEX: 24.44 KG/M2 | HEART RATE: 82 BPM | WEIGHT: 165 LBS

## 2024-10-18 DIAGNOSIS — M79.89 OTHER SPECIFIED SOFT TISSUE DISORDERS: ICD-10-CM

## 2024-10-18 DIAGNOSIS — T14.8XXA OTHER INJURY OF UNSPECIFIED BODY REGION, INITIAL ENCOUNTER: ICD-10-CM

## 2024-10-18 DIAGNOSIS — Z90.79 ACQUIRED ABSENCE OF OTHER GENITAL ORGAN(S): Chronic | ICD-10-CM

## 2024-10-18 DIAGNOSIS — Z98.890 OTHER SPECIFIED POSTPROCEDURAL STATES: Chronic | ICD-10-CM

## 2024-10-18 DIAGNOSIS — S89.90XA UNSPECIFIED INJURY OF UNSPECIFIED LOWER LEG, INITIAL ENCOUNTER: Chronic | ICD-10-CM

## 2024-10-18 DIAGNOSIS — L03.116 CELLULITIS OF LEFT LOWER LIMB: ICD-10-CM

## 2024-10-18 PROCEDURE — 73552 X-RAY EXAM OF FEMUR 2/>: CPT | Mod: LT

## 2024-10-18 PROCEDURE — 99214 OFFICE O/P EST MOD 30 MIN: CPT

## 2024-10-18 PROCEDURE — 93971 EXTREMITY STUDY: CPT | Mod: 26,LT

## 2024-10-18 RX ORDER — CEPHALEXIN 500 MG/1
500 TABLET, FILM COATED ORAL
Qty: 14 | Refills: 0 | Status: ACTIVE | COMMUNITY
Start: 2024-10-18 | End: 1900-01-01

## 2024-10-24 ENCOUNTER — APPOINTMENT (OUTPATIENT)
Dept: GASTROENTEROLOGY | Facility: CLINIC | Age: 74
End: 2024-10-24
Payer: MEDICARE

## 2024-10-24 VITALS
HEIGHT: 69 IN | DIASTOLIC BLOOD PRESSURE: 70 MMHG | SYSTOLIC BLOOD PRESSURE: 138 MMHG | BODY MASS INDEX: 24.14 KG/M2 | WEIGHT: 163 LBS

## 2024-10-24 DIAGNOSIS — Z12.11 ENCOUNTER FOR SCREENING FOR MALIGNANT NEOPLASM OF COLON: ICD-10-CM

## 2024-10-24 DIAGNOSIS — Z80.0 ENCOUNTER FOR SCREENING FOR MALIGNANT NEOPLASM OF COLON: ICD-10-CM

## 2024-10-24 DIAGNOSIS — K22.70 BARRETT'S ESOPHAGUS W/OUT DYSPLASIA: ICD-10-CM

## 2024-10-24 PROCEDURE — 99214 OFFICE O/P EST MOD 30 MIN: CPT

## 2024-10-29 ENCOUNTER — APPOINTMENT (OUTPATIENT)
Dept: ORTHOPEDIC SURGERY | Facility: CLINIC | Age: 74
End: 2024-10-29

## 2024-10-29 DIAGNOSIS — M79.89 OTHER SPECIFIED SOFT TISSUE DISORDERS: ICD-10-CM

## 2024-10-29 DIAGNOSIS — T14.8XXA OTHER INJURY OF UNSPECIFIED BODY REGION, INITIAL ENCOUNTER: ICD-10-CM

## 2024-10-29 PROCEDURE — 99213 OFFICE O/P EST LOW 20 MIN: CPT

## 2024-10-31 VITALS — HEIGHT: 69 IN | WEIGHT: 163 LBS | BODY MASS INDEX: 24.14 KG/M2

## 2024-11-01 ENCOUNTER — NON-APPOINTMENT (OUTPATIENT)
Age: 74
End: 2024-11-01

## 2024-11-01 ENCOUNTER — RESULT REVIEW (OUTPATIENT)
Age: 74
End: 2024-11-01

## 2024-11-01 ENCOUNTER — APPOINTMENT (OUTPATIENT)
Dept: GASTROENTEROLOGY | Facility: AMBULATORY MEDICAL SERVICES | Age: 74
End: 2024-11-01
Payer: MEDICARE

## 2024-11-01 PROCEDURE — 45378 DIAGNOSTIC COLONOSCOPY: CPT

## 2024-11-01 PROCEDURE — 43239 EGD BIOPSY SINGLE/MULTIPLE: CPT

## 2024-11-20 PROCEDURE — 93971 EXTREMITY STUDY: CPT

## 2024-11-26 ENCOUNTER — APPOINTMENT (OUTPATIENT)
Dept: ORTHOPEDIC SURGERY | Facility: CLINIC | Age: 74
End: 2024-11-26
Payer: MEDICARE

## 2024-11-26 VITALS
SYSTOLIC BLOOD PRESSURE: 127 MMHG | BODY MASS INDEX: 24.14 KG/M2 | HEART RATE: 76 BPM | WEIGHT: 163 LBS | HEIGHT: 69 IN | DIASTOLIC BLOOD PRESSURE: 68 MMHG

## 2024-11-26 DIAGNOSIS — S80.11XA CONTUSION OF RIGHT LOWER LEG, INITIAL ENCOUNTER: ICD-10-CM

## 2024-11-26 DIAGNOSIS — T14.8XXA OTHER INJURY OF UNSPECIFIED BODY REGION, INITIAL ENCOUNTER: ICD-10-CM

## 2024-11-26 PROCEDURE — 99213 OFFICE O/P EST LOW 20 MIN: CPT

## 2024-11-27 ENCOUNTER — NON-APPOINTMENT (OUTPATIENT)
Age: 74
End: 2024-11-27

## 2024-11-27 ENCOUNTER — APPOINTMENT (OUTPATIENT)
Dept: NEUROLOGY | Facility: CLINIC | Age: 74
End: 2024-11-27
Payer: MEDICARE

## 2024-11-27 VITALS
WEIGHT: 160 LBS | DIASTOLIC BLOOD PRESSURE: 80 MMHG | TEMPERATURE: 98.2 F | HEART RATE: 82 BPM | HEIGHT: 66 IN | BODY MASS INDEX: 25.71 KG/M2 | SYSTOLIC BLOOD PRESSURE: 149 MMHG

## 2024-11-27 DIAGNOSIS — G47.52 REM SLEEP BEHAVIOR DISORDER: ICD-10-CM

## 2024-11-27 DIAGNOSIS — G20.A1 PARKINSON'S DISEASE WITHOUT DYSKINESIA, WITHOUT MENTION OF FLUCTUATIONS: ICD-10-CM

## 2024-11-27 PROCEDURE — G2211 COMPLEX E/M VISIT ADD ON: CPT

## 2024-11-27 PROCEDURE — 99213 OFFICE O/P EST LOW 20 MIN: CPT

## 2024-12-04 ENCOUNTER — APPOINTMENT (OUTPATIENT)
Dept: FAMILY MEDICINE | Facility: CLINIC | Age: 74
End: 2024-12-04
Payer: MEDICARE

## 2024-12-04 VITALS
HEART RATE: 81 BPM | TEMPERATURE: 98.2 F | BODY MASS INDEX: 26.03 KG/M2 | HEIGHT: 66 IN | SYSTOLIC BLOOD PRESSURE: 108 MMHG | OXYGEN SATURATION: 96 % | DIASTOLIC BLOOD PRESSURE: 70 MMHG | WEIGHT: 162 LBS

## 2024-12-04 VITALS — SYSTOLIC BLOOD PRESSURE: 120 MMHG | DIASTOLIC BLOOD PRESSURE: 70 MMHG

## 2024-12-04 DIAGNOSIS — J20.9 ACUTE BRONCHITIS, UNSPECIFIED: ICD-10-CM

## 2024-12-04 PROCEDURE — 99213 OFFICE O/P EST LOW 20 MIN: CPT

## 2024-12-04 RX ORDER — BENZONATATE 200 MG/1
200 CAPSULE ORAL 3 TIMES DAILY
Qty: 30 | Refills: 0 | Status: ACTIVE | COMMUNITY
Start: 2024-12-04 | End: 1900-01-01

## 2024-12-04 RX ORDER — ALBUTEROL SULFATE 90 UG/1
108 (90 BASE) INHALANT RESPIRATORY (INHALATION)
Qty: 1 | Refills: 0 | Status: ACTIVE | COMMUNITY
Start: 2024-12-04 | End: 1900-01-01

## 2024-12-04 RX ORDER — AZITHROMYCIN 250 MG/1
250 TABLET, FILM COATED ORAL
Qty: 1 | Refills: 0 | Status: COMPLETED | COMMUNITY
Start: 2024-12-04 | End: 2024-12-09

## 2024-12-06 DIAGNOSIS — M79.89 OTHER SPECIFIED SOFT TISSUE DISORDERS: ICD-10-CM

## 2024-12-06 RX ORDER — HYDROCODONE BITARTRATE AND HOMATROPINE METHYLBROMIDE 1.5; 5 MG/5ML; MG/5ML
5-1.5 SOLUTION ORAL
Qty: 70 | Refills: 0 | Status: ACTIVE | COMMUNITY
Start: 2024-12-04 | End: 1900-01-01

## 2024-12-12 ENCOUNTER — APPOINTMENT (OUTPATIENT)
Dept: CARDIOLOGY | Facility: CLINIC | Age: 74
End: 2024-12-12
Payer: MEDICARE

## 2024-12-12 ENCOUNTER — NON-APPOINTMENT (OUTPATIENT)
Age: 74
End: 2024-12-12

## 2024-12-12 VITALS
HEART RATE: 82 BPM | OXYGEN SATURATION: 100 % | DIASTOLIC BLOOD PRESSURE: 64 MMHG | HEIGHT: 66 IN | BODY MASS INDEX: 25.39 KG/M2 | WEIGHT: 158 LBS | SYSTOLIC BLOOD PRESSURE: 128 MMHG

## 2024-12-12 DIAGNOSIS — E78.00 PURE HYPERCHOLESTEROLEMIA, UNSPECIFIED: ICD-10-CM

## 2024-12-12 DIAGNOSIS — I65.29 OCCLUSION AND STENOSIS OF UNSPECIFIED CAROTID ARTERY: ICD-10-CM

## 2024-12-12 DIAGNOSIS — I10 ESSENTIAL (PRIMARY) HYPERTENSION: ICD-10-CM

## 2024-12-12 PROCEDURE — 93000 ELECTROCARDIOGRAM COMPLETE: CPT

## 2024-12-12 PROCEDURE — 99214 OFFICE O/P EST MOD 30 MIN: CPT

## 2024-12-12 PROCEDURE — G2211 COMPLEX E/M VISIT ADD ON: CPT

## 2024-12-12 RX ORDER — EZETIMIBE 10 MG/1
10 TABLET ORAL DAILY
Qty: 30 | Refills: 2 | Status: ACTIVE | COMMUNITY
Start: 2024-12-12 | End: 1900-01-01

## 2024-12-17 ENCOUNTER — OFFICE (OUTPATIENT)
Dept: URBAN - METROPOLITAN AREA CLINIC 102 | Facility: CLINIC | Age: 74
Setting detail: OPHTHALMOLOGY
End: 2024-12-17
Payer: MEDICARE

## 2024-12-17 ENCOUNTER — RX ONLY (RX ONLY)
Age: 74
End: 2024-12-17

## 2024-12-17 DIAGNOSIS — H43.813: ICD-10-CM

## 2024-12-17 DIAGNOSIS — H26.491: ICD-10-CM

## 2024-12-17 DIAGNOSIS — H25.89: ICD-10-CM

## 2024-12-17 DIAGNOSIS — H40.013: ICD-10-CM

## 2024-12-17 DIAGNOSIS — Z96.1: ICD-10-CM

## 2024-12-17 DIAGNOSIS — H35.3132: ICD-10-CM

## 2024-12-17 DIAGNOSIS — H35.373: ICD-10-CM

## 2024-12-17 DIAGNOSIS — H16.223: ICD-10-CM

## 2024-12-17 PROCEDURE — 92014 COMPRE OPH EXAM EST PT 1/>: CPT | Performed by: OPHTHALMOLOGY

## 2024-12-17 PROCEDURE — 92083 EXTENDED VISUAL FIELD XM: CPT | Performed by: OPHTHALMOLOGY

## 2024-12-17 PROCEDURE — 92133 CPTRZD OPH DX IMG PST SGM ON: CPT | Performed by: OPHTHALMOLOGY

## 2024-12-17 ASSESSMENT — REFRACTION_CURRENTRX
OD_CYLINDER: -0.25
OS_AXIS: 078
OD_SPHERE: -2.50
OS_CYLINDER: -0.25
OD_ADD: +2.50
OD_AXIS: 90
OS_CYLINDER: -0.50
OS_VPRISM_DIRECTION: PROGS
OS_OVR_VA: 20/
OS_CYLINDER: -1.25
OS_ADD: +2.25
OS_SPHERE: -4.00
OS_AXIS: 117
OD_AXIS: 0
OD_CYLINDER: 0.00
OS_AXIS: 107
OD_VPRISM_DIRECTION: SV
OD_SPHERE: -4.00
OS_SPHERE: -2.25
OS_AXIS: 96
OD_VPRISM_DIRECTION: PROGS
OD_CYLINDER: -0.50
OD_ADD: +2.25
OS_ADD: +2.50
OS_SPHERE: -3.25
OS_ADD: +2.25
OD_OVR_VA: 20/
OD_VPRISM_DIRECTION: PROGS
OD_SPHERE: -3.25
OS_AXIS: 117
OS_OVR_VA: 20/
OD_VPRISM_DIRECTION: PROGS
OS_VPRISM_DIRECTION: PROGS
OD_AXIS: 180
OS_VPRISM_DIRECTION: SV
OD_CYLINDER: -0.75
OS_VPRISM_DIRECTION: SV
OS_OVR_VA: 20/
OD_VPRISM_DIRECTION: SV
OS_VPRISM_DIRECTION: PROGS
OD_CYLINDER: SPHERE
OD_AXIS: 115
OD_SPHERE: -3.00
OD_AXIS: 089
OD_SPHERE: -1.50
OS_CYLINDER: -0.50
OS_SPHERE: -3.75
OD_ADD: +2.25
OD_OVR_VA: 20/
OS_CYLINDER: -0.50
OD_OVR_VA: 20/
OS_SPHERE: -2.75

## 2024-12-17 ASSESSMENT — REFRACTION_MANIFEST
OS_VA1: 20/20-1
OS_SPHERE: -6.00
OD_SPHERE: -3.25
OD_ADD: +2.50
OS_CYLINDER: -0.50
OD_CYLINDER: SPH
OS_CYLINDER: -0.50
OS_AXIS: 120
OS_VA1: 20/NI
OD_AXIS: 75
OD_CYLINDER: -0.25
OS_ADD: +2.50
OD_VA1: 20/30
OD_SPHERE: -3.25
OS_SPHERE: -3.75
OD_VA1: 20/20
OS_AXIS: 090

## 2024-12-17 ASSESSMENT — PACHYMETRY
OD_CT_UM: 609
OS_CT_UM: 597
OD_CT_CORRECTION: -4
OS_CT_CORRECTION: -4

## 2024-12-17 ASSESSMENT — REFRACTION_AUTOREFRACTION
OS_SPHERE: -2.00
OD_CYLINDER: -1.25
OS_AXIS: 090
OD_AXIS: 120
OS_CYLINDER: -0.50
OD_SPHERE: -2.00

## 2024-12-17 ASSESSMENT — CONFRONTATIONAL VISUAL FIELD TEST (CVF)
OS_FINDINGS: FULL
OD_FINDINGS: FULL

## 2024-12-17 ASSESSMENT — KERATOMETRY
OS_K1POWER_DIOPTERS: 44.00
OD_K1POWER_DIOPTERS: 43.75
OD_AXISANGLE_DEGREES: 050
OD_K2POWER_DIOPTERS: 44.00
OS_AXISANGLE_DEGREES: 090
METHOD_AUTO_MANUAL: AUTO
OS_K2POWER_DIOPTERS: 44.00

## 2024-12-17 ASSESSMENT — CORNEAL DYSTROPHY - POSTERIOR
OS_POSTERIORDYSTROPHY: T FUCHS
OD_POSTERIORDYSTROPHY: T FUCHS

## 2024-12-17 ASSESSMENT — TONOMETRY: OD_IOP_MMHG: 18

## 2024-12-17 ASSESSMENT — DECREASING TEAR LAKE - SEVERITY SCORE
OS_DEC_TEARLAKE: 1+
OD_DEC_TEARLAKE: 1+

## 2024-12-17 ASSESSMENT — VISUAL ACUITY
OD_BCVA: 20/30-1
OS_BCVA: 20/30+

## 2024-12-18 NOTE — ED ADULT TRIAGE NOTE - DIRECT TO ROOM CARE INITIATED:
[facility] APPOINTMENT CONFIRMATION    Date: 12/23/24    Time: 10:30am    [facility] [place] [address] [place] [address], [address] [facility] [id number]    Provider name: Dr. Christian Blank     Type: In-office visit    Zoom link sent (if applicable): N/A    Will patient be in [place] for the virtual visit? N/A    Is patient currently in a facility? No    Patient will bring new patient packet to appointment: N/A    Alternative contact information: n/a    Appointment confirmed: left phone message    \"Please ensure you bring the medication bottles, including the dates and times you take each medication.\"    \"Please remember to bring your hearing aids to your appointment if you use them.\"    \"We do have free parking available in the main hospital parking lot. However, parking can be difficult to find so we ask that you arrive 40 minutes prior to your appointment.\"     21-Sep-2019 11:51

## 2024-12-29 ENCOUNTER — NON-APPOINTMENT (OUTPATIENT)
Age: 74
End: 2024-12-29

## 2025-01-07 ENCOUNTER — APPOINTMENT (OUTPATIENT)
Dept: ORTHOPEDIC SURGERY | Facility: CLINIC | Age: 75
End: 2025-01-07
Payer: MEDICARE

## 2025-01-07 ENCOUNTER — RX RENEWAL (OUTPATIENT)
Age: 75
End: 2025-01-07

## 2025-01-07 DIAGNOSIS — M79.89 OTHER SPECIFIED SOFT TISSUE DISORDERS: ICD-10-CM

## 2025-01-07 DIAGNOSIS — S80.11XA CONTUSION OF RIGHT LOWER LEG, INITIAL ENCOUNTER: ICD-10-CM

## 2025-01-07 DIAGNOSIS — T14.8XXA OTHER INJURY OF UNSPECIFIED BODY REGION, INITIAL ENCOUNTER: ICD-10-CM

## 2025-01-07 PROCEDURE — 99213 OFFICE O/P EST LOW 20 MIN: CPT

## 2025-01-09 VITALS — WEIGHT: 158 LBS | HEIGHT: 66 IN | BODY MASS INDEX: 25.39 KG/M2

## 2025-03-24 ENCOUNTER — RX RENEWAL (OUTPATIENT)
Age: 75
End: 2025-03-24

## 2025-04-10 ENCOUNTER — NON-APPOINTMENT (OUTPATIENT)
Age: 75
End: 2025-04-10

## 2025-04-10 ENCOUNTER — APPOINTMENT (OUTPATIENT)
Dept: CARDIOLOGY | Facility: CLINIC | Age: 75
End: 2025-04-10
Payer: MEDICARE

## 2025-04-10 VITALS
WEIGHT: 162 LBS | OXYGEN SATURATION: 98 % | BODY MASS INDEX: 26.03 KG/M2 | SYSTOLIC BLOOD PRESSURE: 128 MMHG | HEIGHT: 66 IN | HEART RATE: 79 BPM | DIASTOLIC BLOOD PRESSURE: 72 MMHG

## 2025-04-10 DIAGNOSIS — I10 ESSENTIAL (PRIMARY) HYPERTENSION: ICD-10-CM

## 2025-04-10 DIAGNOSIS — I25.10 ATHEROSCLEROTIC HEART DISEASE OF NATIVE CORONARY ARTERY W/OUT ANGINA PECTORIS: ICD-10-CM

## 2025-04-10 DIAGNOSIS — E78.00 PURE HYPERCHOLESTEROLEMIA, UNSPECIFIED: ICD-10-CM

## 2025-04-10 DIAGNOSIS — G20.A1 PARKINSON'S DISEASE WITHOUT DYSKINESIA, WITHOUT MENTION OF FLUCTUATIONS: ICD-10-CM

## 2025-04-10 PROCEDURE — 99214 OFFICE O/P EST MOD 30 MIN: CPT

## 2025-04-10 PROCEDURE — G2211 COMPLEX E/M VISIT ADD ON: CPT

## 2025-04-10 PROCEDURE — 93000 ELECTROCARDIOGRAM COMPLETE: CPT

## 2025-04-14 ENCOUNTER — RX RENEWAL (OUTPATIENT)
Age: 75
End: 2025-04-14

## 2025-04-22 ENCOUNTER — RX RENEWAL (OUTPATIENT)
Age: 75
End: 2025-04-22

## 2025-05-05 ENCOUNTER — RX RENEWAL (OUTPATIENT)
Age: 75
End: 2025-05-05

## 2025-05-23 ENCOUNTER — APPOINTMENT (OUTPATIENT)
Dept: VASCULAR SURGERY | Facility: CLINIC | Age: 75
End: 2025-05-23
Payer: MEDICARE

## 2025-05-23 VITALS
DIASTOLIC BLOOD PRESSURE: 90 MMHG | HEART RATE: 81 BPM | WEIGHT: 158 LBS | OXYGEN SATURATION: 99 % | BODY MASS INDEX: 25.39 KG/M2 | SYSTOLIC BLOOD PRESSURE: 163 MMHG | HEIGHT: 66 IN

## 2025-05-23 DIAGNOSIS — I89.0 LYMPHEDEMA, NOT ELSEWHERE CLASSIFIED: ICD-10-CM

## 2025-05-23 PROCEDURE — 93970 EXTREMITY STUDY: CPT

## 2025-05-23 PROCEDURE — 99214 OFFICE O/P EST MOD 30 MIN: CPT

## 2025-06-10 ENCOUNTER — NON-APPOINTMENT (OUTPATIENT)
Age: 75
End: 2025-06-10

## 2025-06-12 ENCOUNTER — APPOINTMENT (OUTPATIENT)
Dept: NEUROLOGY | Facility: CLINIC | Age: 75
End: 2025-06-12
Payer: MEDICARE

## 2025-06-12 VITALS
HEART RATE: 80 BPM | HEIGHT: 66 IN | WEIGHT: 157 LBS | TEMPERATURE: 98.2 F | BODY MASS INDEX: 25.23 KG/M2 | SYSTOLIC BLOOD PRESSURE: 136 MMHG | DIASTOLIC BLOOD PRESSURE: 80 MMHG

## 2025-06-12 PROBLEM — R26.89 BALANCE DISORDER: Status: ACTIVE | Noted: 2025-06-12

## 2025-06-12 PROCEDURE — 99213 OFFICE O/P EST LOW 20 MIN: CPT

## 2025-06-12 PROCEDURE — G2211 COMPLEX E/M VISIT ADD ON: CPT

## 2025-06-24 ENCOUNTER — OFFICE (OUTPATIENT)
Dept: URBAN - METROPOLITAN AREA CLINIC 102 | Facility: CLINIC | Age: 75
Setting detail: OPHTHALMOLOGY
End: 2025-06-24
Payer: MEDICARE

## 2025-06-24 DIAGNOSIS — H26.491: ICD-10-CM

## 2025-06-24 DIAGNOSIS — H40.013: ICD-10-CM

## 2025-06-24 DIAGNOSIS — H43.813: ICD-10-CM

## 2025-06-24 DIAGNOSIS — H35.373: ICD-10-CM

## 2025-06-24 DIAGNOSIS — H25.89: ICD-10-CM

## 2025-06-24 DIAGNOSIS — Z96.1: ICD-10-CM

## 2025-06-24 DIAGNOSIS — H35.3132: ICD-10-CM

## 2025-06-24 DIAGNOSIS — H16.223: ICD-10-CM

## 2025-06-24 PROCEDURE — 92134 CPTRZ OPH DX IMG PST SGM RTA: CPT | Performed by: OPHTHALMOLOGY

## 2025-06-24 PROCEDURE — 92014 COMPRE OPH EXAM EST PT 1/>: CPT | Performed by: OPHTHALMOLOGY

## 2025-06-24 ASSESSMENT — REFRACTION_CURRENTRX
OD_SPHERE: -3.25
OS_CYLINDER: -1.25
OD_OVR_VA: 20/
OS_CYLINDER: -0.50
OD_OVR_VA: 20/
OS_AXIS: 107
OS_AXIS: 117
OD_SPHERE: -3.00
OS_ADD: +2.25
OS_AXIS: 117
OD_AXIS: 115
OD_AXIS: 0
OS_SPHERE: -4.00
OD_ADD: +2.50
OD_VPRISM_DIRECTION: PROGS
OS_VPRISM_DIRECTION: PROGS
OS_CYLINDER: -0.25
OS_SPHERE: -3.75
OD_ADD: +2.25
OD_CYLINDER: -0.25
OD_VPRISM_DIRECTION: PROGS
OD_SPHERE: -2.50
OS_VPRISM_DIRECTION: PROGS
OS_CYLINDER: -0.50
OD_VPRISM_DIRECTION: SV
OD_OVR_VA: 20/
OS_SPHERE: -2.75
OS_OVR_VA: 20/
OD_CYLINDER: -0.50
OS_SPHERE: -2.25
OD_CYLINDER: 0.00
OD_SPHERE: -4.00
OS_VPRISM_DIRECTION: SV
OD_SPHERE: -1.50
OS_ADD: +2.50
OD_AXIS: 180
OS_OVR_VA: 20/
OS_CYLINDER: -0.50
OD_VPRISM_DIRECTION: PROGS
OS_AXIS: 96
OS_AXIS: 078
OS_SPHERE: -3.25
OD_AXIS: 90
OD_CYLINDER: SPHERE
OD_AXIS: 089
OD_CYLINDER: -0.75
OS_OVR_VA: 20/
OS_VPRISM_DIRECTION: SV
OS_VPRISM_DIRECTION: PROGS
OD_ADD: +2.25
OS_ADD: +2.25
OD_VPRISM_DIRECTION: SV

## 2025-06-24 ASSESSMENT — PACHYMETRY
OD_CT_UM: 609
OS_CT_UM: 597
OD_CT_CORRECTION: -4
OS_CT_CORRECTION: -4

## 2025-06-24 ASSESSMENT — REFRACTION_MANIFEST
OD_VA1: 20/30
OS_SPHERE: -6.00
OD_CYLINDER: -0.25
OS_VA1: 20/NI
OS_CYLINDER: -0.50
OS_AXIS: 120
OD_SPHERE: -3.25
OS_VA1: 20/20-1
OS_ADD: +2.50
OS_CYLINDER: -0.50
OD_SPHERE: -3.25
OD_VA1: 20/20
OS_SPHERE: -3.75
OD_CYLINDER: SPH
OD_AXIS: 75
OS_AXIS: 090
OD_ADD: +2.50

## 2025-06-24 ASSESSMENT — CONFRONTATIONAL VISUAL FIELD TEST (CVF)
OS_FINDINGS: FULL
OD_FINDINGS: FULL

## 2025-06-24 ASSESSMENT — DECREASING TEAR LAKE - SEVERITY SCORE
OD_DEC_TEARLAKE: 1+
OS_DEC_TEARLAKE: 1+

## 2025-06-24 ASSESSMENT — REFRACTION_AUTOREFRACTION
OD_CYLINDER: -1.75
OS_AXIS: 90
OD_AXIS: 109
OS_SPHERE: -2.00
OS_CYLINDER: -0.75
OD_SPHERE: -1.75

## 2025-06-24 ASSESSMENT — KERATOMETRY
OD_K1POWER_DIOPTERS: 43.25
OS_K1POWER_DIOPTERS: 43.25
OD_K2POWER_DIOPTERS: 44.25
METHOD_AUTO_MANUAL: AUTO
OS_K2POWER_DIOPTERS: 44.00
OD_AXISANGLE_DEGREES: 17
OS_AXISANGLE_DEGREES: 174

## 2025-06-24 ASSESSMENT — TONOMETRY
OS_IOP_MMHG: 13
OD_IOP_MMHG: 15

## 2025-06-24 ASSESSMENT — CORNEAL DYSTROPHY - POSTERIOR
OS_POSTERIORDYSTROPHY: T FUCHS
OD_POSTERIORDYSTROPHY: T FUCHS

## 2025-06-24 ASSESSMENT — VISUAL ACUITY
OD_BCVA: 20/30
OS_BCVA: 20/30-1

## 2025-07-28 ENCOUNTER — APPOINTMENT (OUTPATIENT)
Dept: NEUROLOGY | Facility: CLINIC | Age: 75
End: 2025-07-28
Payer: MEDICARE

## 2025-07-28 VITALS
WEIGHT: 158 LBS | DIASTOLIC BLOOD PRESSURE: 83 MMHG | SYSTOLIC BLOOD PRESSURE: 149 MMHG | BODY MASS INDEX: 25.39 KG/M2 | RESPIRATION RATE: 15 BRPM | HEART RATE: 80 BPM | HEIGHT: 66 IN

## 2025-07-28 PROCEDURE — G2212 PROLONG OUTPT/OFFICE VIS: CPT

## 2025-07-28 PROCEDURE — 99215 OFFICE O/P EST HI 40 MIN: CPT

## 2025-07-28 RX ORDER — CARBIDOPA AND LEVODOPA 25; 100 MG/1; MG/1
25-100 TABLET ORAL
Qty: 270 | Refills: 1 | Status: ACTIVE | COMMUNITY
Start: 2025-07-28 | End: 1900-01-01

## 2025-08-15 ENCOUNTER — APPOINTMENT (OUTPATIENT)
Dept: CARDIOLOGY | Facility: CLINIC | Age: 75
End: 2025-08-15
Payer: MEDICARE

## 2025-08-15 VITALS
OXYGEN SATURATION: 99 % | SYSTOLIC BLOOD PRESSURE: 134 MMHG | HEIGHT: 66 IN | BODY MASS INDEX: 25.71 KG/M2 | DIASTOLIC BLOOD PRESSURE: 74 MMHG | WEIGHT: 160 LBS | HEART RATE: 79 BPM

## 2025-08-15 DIAGNOSIS — I10 ESSENTIAL (PRIMARY) HYPERTENSION: ICD-10-CM

## 2025-08-15 DIAGNOSIS — G20.A1 PARKINSON'S DISEASE WITHOUT DYSKINESIA, WITHOUT MENTION OF FLUCTUATIONS: ICD-10-CM

## 2025-08-15 DIAGNOSIS — I25.10 ATHEROSCLEROTIC HEART DISEASE OF NATIVE CORONARY ARTERY W/OUT ANGINA PECTORIS: ICD-10-CM

## 2025-08-15 PROCEDURE — 93000 ELECTROCARDIOGRAM COMPLETE: CPT

## 2025-08-15 PROCEDURE — G2211 COMPLEX E/M VISIT ADD ON: CPT

## 2025-08-15 PROCEDURE — 99214 OFFICE O/P EST MOD 30 MIN: CPT

## 2025-08-15 RX ORDER — ASPIRIN 81 MG
81 TABLET, DELAYED RELEASE (ENTERIC COATED) ORAL DAILY
Refills: 0 | Status: ACTIVE | COMMUNITY

## 2025-09-02 ENCOUNTER — RX RENEWAL (OUTPATIENT)
Age: 75
End: 2025-09-02

## 2025-09-03 ENCOUNTER — APPOINTMENT (OUTPATIENT)
Dept: NEUROLOGY | Facility: CLINIC | Age: 75
End: 2025-09-03
Payer: MEDICARE

## 2025-09-03 VITALS
DIASTOLIC BLOOD PRESSURE: 73 MMHG | HEIGHT: 66 IN | RESPIRATION RATE: 15 BRPM | WEIGHT: 160 LBS | SYSTOLIC BLOOD PRESSURE: 136 MMHG | BODY MASS INDEX: 25.71 KG/M2 | HEART RATE: 81 BPM

## 2025-09-03 DIAGNOSIS — G20.A1 PARKINSON'S DISEASE WITHOUT DYSKINESIA, WITHOUT MENTION OF FLUCTUATIONS: ICD-10-CM

## 2025-09-03 PROCEDURE — 99214 OFFICE O/P EST MOD 30 MIN: CPT
